# Patient Record
Sex: MALE | Race: WHITE
[De-identification: names, ages, dates, MRNs, and addresses within clinical notes are randomized per-mention and may not be internally consistent; named-entity substitution may affect disease eponyms.]

---

## 2018-03-22 ENCOUNTER — HOSPITAL ENCOUNTER (OUTPATIENT)
Dept: HOSPITAL 47 - RADECHMAIN | Age: 58
Discharge: HOME | End: 2018-03-22
Payer: COMMERCIAL

## 2018-03-22 DIAGNOSIS — I34.0: Primary | ICD-10-CM

## 2018-03-22 DIAGNOSIS — E66.01: ICD-10-CM

## 2018-03-22 PROCEDURE — 93306 TTE W/DOPPLER COMPLETE: CPT

## 2018-03-23 NOTE — ECHOF
Referral Reason:R01.1 Cardiac murmur



MEASUREMENTS

--------

HEIGHT: 175.3 cm

WEIGHT: 164.7 kg

BP: 

RVIDd:   3.9 cm     (< 3.3)

IVSd:   1.5 cm     (0.6 - 1.1)

LVIDd:   5.6 cm     (3.9 - 5.3)

LVPWd:   1.1 cm     (0.6 - 1.1)

IVSs:   1.6 cm

LVIDs:   4.2 cm

LVPWs:   1.0 cm

LA Diam:   4.6 cm     (2.7 - 3.8)

LAESV Index (A-L):   36.10 ml/m

EPSS:   0.4 cm

Ao Diam:   3.4 cm     (2.0 - 3.7)

LA Diam:   2.2 cm     (2.7 - 3.8)

RVIDd:   3.8 cm     (< 3.3)

MV E Dutch:   0.73 m/s

MV DecT:   331 ms

MV A Dutch:   0.83 m/s

MV E/A Ratio:   0.89 

RAP:   5.00 mmHg

RVSP:   19.64 mmHg

MV EF SLOPE:   91.32 mm/s     (70 - 150)

MV EXCURSION:   1.94 cm     (> 18.000)







FINDINGS

--------

Sinus rhythm.

Morbid Obesity

The left ventricular size is normal.   There is mild concentric left ventricular hypertrophy.   Overa
ll left ventricular systolic function is low-normal with, an EF between 50 - 55 %.

The right ventricle is moderate to  severely enlarged.

The left atrium is markedly dilated.   LA is moderately dilated 34-39 ml/m2

The right atrial size is normal.

The aortic valve is trileaflet, and appears structurally normal. No aortic stenosis or regurgitation.


Mild mitral regurgitation is present.

No regurgitation noted   There is no evidence of pulmonary hypertension.

There is no pulmonic regurgitation present.

The aortic root size is normal.

There is no pericardial effusion.



CONCLUSIONS

--------

1. Morbid Obesity

2. The left ventricular size is normal.

3. There is mild concentric left ventricular hypertrophy.

4. Overall left ventricular systolic function is low-normal with, an EF between 50 - 55 %.

5. The right ventricle is moderate to  severely enlarged.

6. The left atrium is markedly dilated.

7. LA is moderately dilated 34-39 ml/m2

8. The aortic valve is trileaflet, and appears structurally normal. No aortic stenosis or regurgitati
on.

9. Mild mitral regurgitation is present.

10. No regurgitation noted

11. There is no evidence of pulmonary hypertension.

12. There is no pulmonic regurgitation present.

13. The aortic root size is normal.

14. There is no pericardial effusion.





SONOGRAPHER: SUKHDEEP Andino

## 2023-01-17 ENCOUNTER — HOSPITAL ENCOUNTER (OUTPATIENT)
Dept: HOSPITAL 47 - RADUSWWP | Age: 63
Discharge: HOME | End: 2023-01-17
Attending: FAMILY MEDICINE
Payer: COMMERCIAL

## 2023-01-17 DIAGNOSIS — I10: Primary | ICD-10-CM

## 2023-01-17 PROCEDURE — 93975 VASCULAR STUDY: CPT

## 2023-01-17 NOTE — US
EXAMINATION TYPE: US renal artery duplex complet

 

DATE OF EXAM: 1/17/2023

 

COMPARISON: NONE

 

CLINICAL HISTORY: 62-year-old male I10 ESSENTIAL HYPERTENSION. Patient is morbidly obese, over 400lbs
, new onset of HTN, patient was given order for renal artery doppler prior to starting BP meds

 

FINDINGS:

 

Sonographer notes:  **Unable to do full study due to morbid obesity and overlying bowel gas** 

 

The provided Doppler images are essentially nondiagnostic due to patient's size. Only small segments 
of either the mid or distal renal arteries could barely be visualized and measured velocities here ar
e likely inaccurate.

 

MEASUREMENTS:

 

RENAL SIZE:

Rt Kidney: 11.8 x 6.3 x 5.9

Lt Kidney: 11.2 x 5.9 x 8.3

 

There is no obvious hydronephrosis. Suspect a 3.1 cm upper to mid pole left renal cortical cyst.

 

RESISTANCE INDEX

Right:  Unable to reliably assess

Left: Unable to reliably assess

 

RA/AO RATIO (< 3.5 )

Right: 1.6 *estimate based off of very limited imaging

Left: 0.8 *estimate based off of very limited imaging

 

RA VELOCITY ( < 180 cm/s)

Right: 107

Left: 53.4

 

Note that these measurements may be inaccurate and are based on very limited sampling of portions of 
the mid or distal renal arteries.

 

 

 

IMPRESSION:

Very large patient size makes the exam essentially nondiagnostic. Unable to satisfactorily exclude th
e possibility of underlying renal artery stenosis. If persistent concern, consider renal artery CTA (
but note that large patient size may limit CT as well).

## 2023-04-04 ENCOUNTER — HOSPITAL ENCOUNTER (OUTPATIENT)
Dept: HOSPITAL 47 - RADXRMAIN | Age: 63
Discharge: HOME | End: 2023-04-04
Attending: INTERNAL MEDICINE
Payer: COMMERCIAL

## 2023-04-04 DIAGNOSIS — J45.998: Primary | ICD-10-CM

## 2023-04-04 DIAGNOSIS — E78.5: ICD-10-CM

## 2023-04-04 DIAGNOSIS — I10: ICD-10-CM

## 2023-04-04 PROCEDURE — 71046 X-RAY EXAM CHEST 2 VIEWS: CPT

## 2023-04-04 NOTE — XR
EXAMINATION TYPE: XR chest 2V

 

DATE OF EXAM: 4/4/2023 1:42 PM

 

COMPARISON: None

 

TECHNIQUE: XR chest 2V Frontal and lateral views of the chest.

 

CLINICAL INDICATION:Male, 62 years old with history of I10 HTN; 

 

FINDINGS: 

Lungs/Pleura: There is no evidence of pleural effusion, focal consolidation, or pneumothorax.  

Pulmonary vascularity: Unremarkable.

Heart/mediastinum: Cardiomediastinal silhouette is unremarkable.

Musculoskeletal: Multiple level degenerative disc disease changes seen throughout the spine. No acute
 osseous abnormality.

 

IMPRESSION: 

No acute cardiopulmonary disease/process.

## 2023-04-19 ENCOUNTER — HOSPITAL ENCOUNTER (OUTPATIENT)
Dept: HOSPITAL 47 - RADUSWWP | Age: 63
Discharge: HOME | End: 2023-04-19
Attending: FAMILY MEDICINE
Payer: COMMERCIAL

## 2023-04-19 DIAGNOSIS — Z53.9: Primary | ICD-10-CM

## 2023-04-26 ENCOUNTER — HOSPITAL ENCOUNTER (OUTPATIENT)
Dept: HOSPITAL 47 - RADUSWWP | Age: 63
Discharge: HOME | End: 2023-04-26
Attending: FAMILY MEDICINE
Payer: COMMERCIAL

## 2023-04-26 DIAGNOSIS — M79.89: ICD-10-CM

## 2023-04-26 DIAGNOSIS — E78.2: Primary | ICD-10-CM

## 2023-04-26 DIAGNOSIS — R25.2: ICD-10-CM

## 2023-04-26 PROCEDURE — 93970 EXTREMITY STUDY: CPT

## 2023-04-26 PROCEDURE — 93922 UPR/L XTREMITY ART 2 LEVELS: CPT

## 2023-04-26 NOTE — US
EXAMINATION TYPE: US venous doppler duplex LE 

 

DATE OF EXAM: 4/26/2023 12:53 PM

 

COMPARISON: NONE

 

CLINICAL INDICATION: Male, 62 years old with history of E78.2 HYPERLIPIDEMIA,M79.89 SOFT TISSUE DISOR
DERS;  left leg edema

 

SIDE PERFORMED: bilateral   

 

TECHNIQUE:  The lower extremity deep venous system is examined utilizing real time linear array sonog
alvarez with graded compression, doppler sonography and color-flow sonography.

 

VESSELS IMAGED:

Common Femoral Vein

Deep Femoral Vein

Greater Saphenous Vein *

Femoral Vein

Popliteal Vein

Small Saphenous Vein *

Proximal Calf Veins

(* superficial vessels)

 

*Limitations due to patient's body habitus, limited evaluation of groin vessels

 

Right Leg:  no evidence of DVT as visualized

 

Left Leg:  no evidence of DVT as visualized 

 

 

 

IMPRESSION:  Grayscale, color doppler, spectral doppler imaging performed of the deep veins of the lo
wer extremities.  There is normal flow, compressibility, vascular waveforms.

## 2025-03-02 ENCOUNTER — HOSPITAL ENCOUNTER (INPATIENT)
Dept: HOSPITAL 47 - EC | Age: 65
LOS: 8 days | Discharge: HOME | DRG: 280 | End: 2025-03-10
Attending: INTERNAL MEDICINE | Admitting: INTERNAL MEDICINE
Payer: COMMERCIAL

## 2025-03-02 VITALS — BODY MASS INDEX: 55.6 KG/M2

## 2025-03-02 DIAGNOSIS — Z79.899: ICD-10-CM

## 2025-03-02 DIAGNOSIS — N39.0: ICD-10-CM

## 2025-03-02 DIAGNOSIS — J96.22: ICD-10-CM

## 2025-03-02 DIAGNOSIS — E78.5: ICD-10-CM

## 2025-03-02 DIAGNOSIS — Z87.891: ICD-10-CM

## 2025-03-02 DIAGNOSIS — Z91.199: ICD-10-CM

## 2025-03-02 DIAGNOSIS — I21.A1: ICD-10-CM

## 2025-03-02 DIAGNOSIS — J96.21: ICD-10-CM

## 2025-03-02 DIAGNOSIS — E87.20: ICD-10-CM

## 2025-03-02 DIAGNOSIS — D72.829: ICD-10-CM

## 2025-03-02 DIAGNOSIS — Z71.3: ICD-10-CM

## 2025-03-02 DIAGNOSIS — J45.909: ICD-10-CM

## 2025-03-02 DIAGNOSIS — Z91.148: ICD-10-CM

## 2025-03-02 DIAGNOSIS — I27.20: ICD-10-CM

## 2025-03-02 DIAGNOSIS — I11.0: Primary | ICD-10-CM

## 2025-03-02 DIAGNOSIS — I50.33: ICD-10-CM

## 2025-03-02 DIAGNOSIS — E66.2: ICD-10-CM

## 2025-03-02 DIAGNOSIS — E11.65: ICD-10-CM

## 2025-03-02 LAB
ALBUMIN SERPL-MCNC: 3.9 G/DL (ref 3.5–5)
ALP SERPL-CCNC: 83 U/L (ref 38–126)
ALT SERPL-CCNC: 23 U/L (ref 4–49)
AMYLASE SERPL-CCNC: 47 U/L (ref 30–110)
ANION GAP SERPL CALC-SCNC: 9 MMOL/L
APTT BLD: 28.7 SEC (ref 22–30)
AST SERPL-CCNC: 27 U/L (ref 17–59)
BASOPHILS # BLD AUTO: 0.1 K/UL (ref 0–0.2)
BASOPHILS NFR BLD AUTO: 0 %
BUN SERPL-SCNC: 14 MG/DL (ref 9–20)
CALCIUM SPEC-MCNC: 9.5 MG/DL (ref 8.4–10.2)
CHLORIDE SERPL-SCNC: 95 MMOL/L (ref 98–107)
CO2 SERPL-SCNC: 36 MMOL/L (ref 22–30)
EOSINOPHIL # BLD AUTO: 0.3 K/UL (ref 0–0.7)
EOSINOPHIL NFR BLD AUTO: 2 %
ERYTHROCYTE [DISTWIDTH] IN BLOOD BY AUTOMATED COUNT: 5.1 M/UL (ref 4.3–5.9)
ERYTHROCYTE [DISTWIDTH] IN BLOOD: 16.2 % (ref 11.5–15.5)
GLUCOSE SERPL-MCNC: 136 MG/DL (ref 74–99)
HCT VFR BLD AUTO: 50.3 % (ref 39–53)
HGB BLD-MCNC: 15.5 GM/DL (ref 13–17.5)
HYALINE CASTS UR QL AUTO: 1 /LPF (ref 0–2)
INR PPP: 1.3 (ref ?–1.2)
LIPASE SERPL-CCNC: 116 U/L (ref 23–300)
LYMPHOCYTES # SPEC AUTO: 1.5 K/UL (ref 1–4.8)
LYMPHOCYTES NFR SPEC AUTO: 9 %
MAGNESIUM SPEC-SCNC: 1.7 MG/DL (ref 1.6–2.3)
MCH RBC QN AUTO: 30.3 PG (ref 25–35)
MCHC RBC AUTO-ENTMCNC: 30.7 G/DL (ref 31–37)
MCV RBC AUTO: 98.7 FL (ref 80–100)
MONOCYTES # BLD AUTO: 0.7 K/UL (ref 0–1)
MONOCYTES NFR BLD AUTO: 4 %
NEUTROPHILS # BLD AUTO: 14.8 K/UL (ref 1.3–7.7)
NEUTROPHILS NFR BLD AUTO: 85 %
NT-PROBNP SERPL-MCNC: 2780 PG/ML
PH UR: 6 [PH] (ref 5–8)
PLATELET # BLD AUTO: 220 K/UL (ref 150–450)
POTASSIUM SERPL-SCNC: 4.6 MMOL/L (ref 3.5–5.1)
PROT SERPL-MCNC: 6.8 G/DL (ref 6.3–8.2)
PROT UR QL: (no result)
PT BLD: 13.6 SEC (ref 10–12.5)
RBC UR QL: 3 /HPF (ref 0–5)
SODIUM SERPL-SCNC: 140 MMOL/L (ref 137–145)
SP GR UR: 1.02 (ref 1–1.03)
SQUAMOUS UR QL AUTO: 1 /HPF (ref 0–4)
UROBILINOGEN UR QL STRIP: 3 MG/DL (ref ?–2)
WBC # BLD AUTO: 17.5 K/UL (ref 3.8–10.6)
WBC #/AREA URNS HPF: 11 /HPF (ref 0–5)

## 2025-03-02 PROCEDURE — 83690 ASSAY OF LIPASE: CPT

## 2025-03-02 PROCEDURE — 82803 BLOOD GASES ANY COMBINATION: CPT

## 2025-03-02 PROCEDURE — 71046 X-RAY EXAM CHEST 2 VIEWS: CPT

## 2025-03-02 PROCEDURE — 85379 FIBRIN DEGRADATION QUANT: CPT

## 2025-03-02 PROCEDURE — 83880 ASSAY OF NATRIURETIC PEPTIDE: CPT

## 2025-03-02 PROCEDURE — 94760 N-INVAS EAR/PLS OXIMETRY 1: CPT

## 2025-03-02 PROCEDURE — 83605 ASSAY OF LACTIC ACID: CPT

## 2025-03-02 PROCEDURE — 82150 ASSAY OF AMYLASE: CPT

## 2025-03-02 PROCEDURE — 96375 TX/PRO/DX INJ NEW DRUG ADDON: CPT

## 2025-03-02 PROCEDURE — 36600 WITHDRAWAL OF ARTERIAL BLOOD: CPT

## 2025-03-02 PROCEDURE — 82805 BLOOD GASES W/O2 SATURATION: CPT

## 2025-03-02 PROCEDURE — 94660 CPAP INITIATION&MGMT: CPT

## 2025-03-02 PROCEDURE — 83036 HEMOGLOBIN GLYCOSYLATED A1C: CPT

## 2025-03-02 PROCEDURE — 87636 SARSCOV2 & INF A&B AMP PRB: CPT

## 2025-03-02 PROCEDURE — 85025 COMPLETE CBC W/AUTO DIFF WBC: CPT

## 2025-03-02 PROCEDURE — 85027 COMPLETE CBC AUTOMATED: CPT

## 2025-03-02 PROCEDURE — 80053 COMPREHEN METABOLIC PANEL: CPT

## 2025-03-02 PROCEDURE — 83735 ASSAY OF MAGNESIUM: CPT

## 2025-03-02 PROCEDURE — 36415 COLL VENOUS BLD VENIPUNCTURE: CPT

## 2025-03-02 PROCEDURE — 84145 PROCALCITONIN (PCT): CPT

## 2025-03-02 PROCEDURE — 84443 ASSAY THYROID STIM HORMONE: CPT

## 2025-03-02 PROCEDURE — 99285 EMERGENCY DEPT VISIT HI MDM: CPT

## 2025-03-02 PROCEDURE — 94640 AIRWAY INHALATION TREATMENT: CPT

## 2025-03-02 PROCEDURE — 84484 ASSAY OF TROPONIN QUANT: CPT

## 2025-03-02 PROCEDURE — 80048 BASIC METABOLIC PNL TOTAL CA: CPT

## 2025-03-02 PROCEDURE — 82607 VITAMIN B-12: CPT

## 2025-03-02 PROCEDURE — 96374 THER/PROPH/DIAG INJ IV PUSH: CPT

## 2025-03-02 PROCEDURE — 86140 C-REACTIVE PROTEIN: CPT

## 2025-03-02 PROCEDURE — 81001 URINALYSIS AUTO W/SCOPE: CPT

## 2025-03-02 PROCEDURE — 85610 PROTHROMBIN TIME: CPT

## 2025-03-02 PROCEDURE — 85730 THROMBOPLASTIN TIME PARTIAL: CPT

## 2025-03-02 PROCEDURE — 80061 LIPID PANEL: CPT

## 2025-03-02 PROCEDURE — 93306 TTE W/DOPPLER COMPLETE: CPT

## 2025-03-02 RX ADMIN — FUROSEMIDE SCH MG: 10 INJECTION, SOLUTION INTRAMUSCULAR; INTRAVENOUS at 23:11

## 2025-03-02 RX ADMIN — NITROGLYCERIN STA MG: 0.4 TABLET SUBLINGUAL at 17:01

## 2025-03-02 RX ADMIN — FUROSEMIDE STA MG: 10 INJECTION, SOLUTION INTRAMUSCULAR; INTRAVENOUS at 15:42

## 2025-03-02 RX ADMIN — ASPIRIN 81 MG CHEWABLE TABLET STA MG: 81 TABLET CHEWABLE at 15:44

## 2025-03-02 RX ADMIN — NITROGLYCERIN STA INCH: 20 OINTMENT TOPICAL at 15:45

## 2025-03-02 RX ADMIN — HEPARIN SODIUM SCH UNIT: 5000 INJECTION, SOLUTION INTRAVENOUS; SUBCUTANEOUS at 21:02

## 2025-03-02 RX ADMIN — ENALAPRILAT SCH MG: 1.25 INJECTION INTRAVENOUS at 16:39

## 2025-03-02 NOTE — XR
EXAMINATION TYPE: XR chest 2V

 

DATE OF EXAM: 3/2/2025 3:13 PM

 

COMPARISON: Previous chest radiograph 4/4/2023.

 

CLINICAL INDICATION: Male, 64 years old with history of TAMERA; PHH

 

TECHNIQUE: XR chest 2V Frontal and lateral views of the chest.

 

FINDINGS: 

Lungs/Pleura: No acute focal consolidation or evidence of pneumothorax. Mild pulmonary vascular conge
stive changes and possible trace effusions.

Pulmonary vascularity: Unremarkable.

Heart/mediastinum: Cardiomegaly.

Musculoskeletal: No acute osseous pathology.

 

Other findings: None

 

IMPRESSION: 

Cardiomegaly and mild pulmonary vascular congestive changes suggestive of CHF.

 

 

 

[facility], Workstation: [id number], 3/2/2025 3:21 PM

## 2025-03-02 NOTE — P.HPIM
History of Present Illness


H&P Date: 03/02/25


Chief Complaint: Abnormal pain/shortness of breath





64-year-old male who presents to the emergency department for abdominal pain and

shortness of breath.  States that he has been dealing with shortness of breath 

for the last 2 weeks.  He has also started to notice swelling in his lower 

extremities and abdomen.  Today he felt a pain in his left upper quadrant that 

occurred about 2 hours prior to arrival.  This has since persisted, prompting 

him to come here for evaluation.  Denies any nausea or vomiting.  Denies any 

changes in bowel or bladder habits.  Denies any history of respiratory or 

cardiac problems.


Patient hypoxic on arrival with an oxygen of 87%.  This improved with 2 L via 

nasal cannula.  


Lab work demonstrates leukocytosis with a white blood cell count of 17.5.  D-

dimer negative.  Lactic acid elevated at 2.3.  BNP elevated at 2780 and troponin

elevated at 0.057.  Patient denies a history of CHF.  


Chest x-ray demonstrates cardiomegaly with pulmonary vascular congestion. 


COVID, influenza, and RSV testing negative.  


Urinalysis negative for signs of infection.  


Given his abdominal pain ER attempted to get a CT scan on the patient.  However,

he could not comfortably lay flat for the testing. 





Review of Systems











REVIEW OF SYSTEMS: 


CONSTITUTIONAL: No fever, no malaise, no fatigue. 


HEENT: No recent visual problems or hearing problems. Denied any sore throat. 


CARDIOVASCULAR: No chest pain, orthopnea, PND, no palpitations, no syncope. 


PULMONARY: No shortness of breath, no cough, no hemoptysis. 


GASTROINTESTINAL: No diarrhea, no nausea, no vomiting, no abdominal pain. 


NEUROLOGICAL: No headaches, no weakness, no numbness. 


HEMATOLOGICAL: Denies any bleeding or petechiae. 


GENITOURINARY: Denies any burning micturition, frequency, or urgency. 


MUSCULOSKELETAL/RHEUMATOLOGICAL: Denies any joint pain, swelling, or any muscle 

pain. 


ENDOCRINE: Denies any polyuria or polydipsia. 





The rest of the 14-point review of systems is negative.








Past Medical History


Past Medical History: Hypertension


History of Any Multi-Drug Resistant Organisms: None Reported


Past Surgical History: No Surgical Hx Reported


Past Psychological History: No Psychological Hx Reported


Smoking Status: Former smoker


Past Alcohol Use History: Rare


Past Drug Use History: None Reported





Medications and Allergies


                                Home Medications











 Medication  Instructions  Recorded  Confirmed  Type


 


No Known Home Medications  03/02/25 03/02/25 History








                                    Allergies











Allergy/AdvReac Type Severity Reaction Status Date / Time


 


tree nut Allergy  Anaphylaxis Verified 03/02/25 16:21














Physical Exam


Vitals: 


                                   Vital Signs











  Temp Pulse Resp BP Pulse Ox


 


 03/02/25 15:49   83  18  212/122  98


 


 03/02/25 12:53  98.3 F  102 H  28 H  194/114  87 L








                                Intake and Output











 03/02/25 03/02/25 03/02/25





 06:59 14:59 22:59


 


Other:   


 


  Weight  158.757 kg 














General appearance: alert, in no apparent distress


Head exam: Present: atraumatic, normocephalic, normal inspection


Respiratory exam: Present: decreased breath sounds, prolonged expiratory


Cardiovascular Exam: Present: normal rhythm, tachycardia


GI/Abdominal exam: Present: soft, distended, tenderness (diffuse), normal bowel 

sounds


Extremities exam: Present: other (Bilateral lower extremity swelling)


Neurological exam: Present: alert, oriented X3, CN II-XII intact


Psychiatric exam: Present: normal affect, normal mood


Skin exam: Present: warm, dry, intact, normal color.  Absent: rash





Results


CBC & Chem 7: 


                                 03/02/25 14:18





                                 03/02/25 14:18


Labs: 


                  Abnormal Lab Results - Last 24 Hours (Table)











  03/02/25 03/02/25 03/02/25 Range/Units





  14:18 14:18 14:18 


 


WBC  17.5 H    (3.8-10.6)  k/uL


 


MCHC  30.7 L    (31.0-37.0)  g/dL


 


RDW  16.2 H    (11.5-15.5)  %


 


Neutrophils #  14.8 H    (1.3-7.7)  k/uL


 


PT   13.6 H   (10.0-12.5)  sec


 


INR   1.3 H   (<1.2)  


 


Chloride     ()  mmol/L


 


Carbon Dioxide     (22-30)  mmol/L


 


Glucose     (74-99)  mg/dL


 


Plasma Lactic Acid Otto     (0.7-2.0)  mmol/L


 


Troponin I     (0.000-0.034)  ng/mL


 


Urine Protein    2+ H  (Negative)  


 


Urine Ketones    Trace H  (Negative)  


 


Ur Leukocyte Esterase    Small H  (Negative)  


 


Urine WBC    11 H  (0-5)  /hpf


 


Urine Mucus    Few H  (None)  /hpf














  03/02/25 03/02/25 03/02/25 Range/Units





  14:18 14:18 14:18 


 


WBC     (3.8-10.6)  k/uL


 


MCHC     (31.0-37.0)  g/dL


 


RDW     (11.5-15.5)  %


 


Neutrophils #     (1.3-7.7)  k/uL


 


PT     (10.0-12.5)  sec


 


INR     (<1.2)  


 


Chloride  95 L    ()  mmol/L


 


Carbon Dioxide  36 H    (22-30)  mmol/L


 


Glucose  136 H    (74-99)  mg/dL


 


Plasma Lactic Acid Otto   2.3 H*   (0.7-2.0)  mmol/L


 


Troponin I    0.057 H*  (0.000-0.034)  ng/mL


 


Urine Protein     (Negative)  


 


Urine Ketones     (Negative)  


 


Ur Leukocyte Esterase     (Negative)  


 


Urine WBC     (0-5)  /hpf


 


Urine Mucus     (None)  /hpf














Assessment and Plan


Assessment: 





1.  New onset CHF; acute exacerbation


-Chest x-ray reveals cardiomegaly with pulmonary vascular congestion; BNP 

elevated at 2780


-Patient is clinically fluid overloaded


-Will admit to telemetry; monitor EKG and trend troponin


-Patient will be placed on IV Lasix; monitor strict JANET's, daily weights, renal 

function electrolytes


-2D echo; consult cardiology





2.  Leukocytosis/possible UTI; lactic acid is elevated at 2.3; no signs of 

infection; chest x-ray is negative for any acute process


-UA reveals small amount of leukocyte esterase


-Will start patient empirically on IV Rocephin and repeat CBC; procalcitonin and

CRP





3.  Elevated troponin; likely related to hypoxia 


- initial troponin is at 0.057; we will trend with plans to initiate IV heparin 

infusion if troponin continues to trend up





4.  Hypertension; lisinopril 20 mg daily





5.  Morbid obesity; counseling done on need for weight reduction





DVT prophylaxis; SCDs/subcu heparin


CODE STATUS; full code

## 2025-03-02 NOTE — ED
Abdominal Pain HPI





- General


Chief Complaint: Abdominal Pain


Stated Complaint: abd pain


Time Seen by Provider: 03/02/25 13:41


Source: patient, RN notes reviewed


Mode of arrival: wheelchair


Limitations: no limitations





- History of Present Illness


Initial Comments: 


This is a 64-year-old male who presents to the emergency department for 

abdominal pain and shortness of breath.  States that he has been dealing with 

shortness of breath for the last 2 weeks.  He has also started to notice 

swelling in his lower extremities and abdomen.  Today he felt a pain in his left

upper quadrant that occurred about 2 hours prior to arrival.  This has since 

persisted, prompting him to come here for evaluation.  Denies any nausea or 

vomiting.  Denies any changes in bowel or bladder habits.  Denies any history of

respiratory or cardiac problems.





MD Complaint: abdominal pain





- Related Data


                                Home Medications











 Medication  Instructions  Recorded  Confirmed


 


No Known Home Medications  03/02/25 03/02/25











                                    Allergies











Allergy/AdvReac Type Severity Reaction Status Date / Time


 


tree nut Allergy  Anaphylaxis Verified 03/02/25 16:21














Review of Systems


ROS Statement: 


Those systems with pertinent positive or pertinent negative responses have been 

documented in the HPI.





ROS Other: All systems not noted in ROS Statement are negative.





Past Medical History


Past Medical History: Hypertension


History of Any Multi-Drug Resistant Organisms: None Reported


Past Surgical History: No Surgical Hx Reported


Past Psychological History: No Psychological Hx Reported


Smoking Status: Former smoker


Past Alcohol Use History: Rare


Past Drug Use History: None Reported





General Exam


Limitations: no limitations


General appearance: alert, in no apparent distress


Head exam: Present: atraumatic, normocephalic, normal inspection


Respiratory exam: Present: decreased breath sounds, prolonged expiratory


Cardiovascular Exam: Present: normal rhythm, tachycardia


GI/Abdominal exam: Present: soft, distended, tenderness (diffuse), normal bowel 

sounds


Extremities exam: Present: other (Bilateral lower extremity swelling)


Neurological exam: Present: alert, oriented X3, CN II-XII intact


Psychiatric exam: Present: normal affect, normal mood


Skin exam: Present: warm, dry, intact, normal color.  Absent: rash





Course


                                   Vital Signs











  03/02/25 03/02/25 03/02/25





  12:53 15:49 17:02


 


Temperature 98.3 F  


 


Pulse Rate 102 H 83 


 


Respiratory 28 H 18 





Rate   


 


Blood Pressure 194/114 212/122 169/93


 


O2 Sat by Pulse 87 L 98 





Oximetry   














Medical Decision Making





- Medical Decision Making


This is a 64-year-old male who presents to the emergency department for 

abdominal pain and shortness of breath.





Was pt. sent in by a medical professional or institution?


@  -No   


Did you speak to anyone other than the patient for history?  


@  -No


Did you review nursing and triage notes? 


@  -Yes, and I agree, it is accurate with regards to the patient's symptoms.


Were old charts reviewed? 


@  -No


Differential Diagnosis? 


@  -Differential Abdominal Pain Men:


Appendicitis, cholecystitis, diverticulosis, ischemic bowel, pancreatitis, 

hepatitis, UTI, gastroenteritis, AAA, incarcerated hernia, bowel obstruction, 

constipation, inflammatory bowel, hepatitis, peptic ulcer disease, splenic 

infarction, perforated viscus, testicular torsion, this is not meant to be an 

all-inclusive list


EKG interpreted by me (3pts min.)?


@  -EKG interpreted by me demonstrating the following: Sinus tachycardia.  

Ventricular rate 100 bpm, AR interval 155 ms, QRS duration 116 ms, QTc 419 ms.


X-rays interpreted by me (1pt min.)?


@  -Chest x-ray obtained.  My interpretation identifies pulmonary vascular 

congestion.


CT interpreted by me (1pt min.)?


@  -Ordered, not obtained at the point of admission.


U/S interpreted by me (1pt. min.)?


@  -Not obtained


What testing was considered but not performed? (CT, X-rays, U/S, labs)? Why?


@  -None


What meds were considered but not given? Why?


@  -None


Did you discuss the management of the patient with other professionals?


@  -Yes, Dr. Cortez, who accepts the patient for admission.


Did you reconcile home meds?


@  -Yes


Was smoking cessation discussed for >3mins.?


@  -No


Was critical care preformed (if so, how long)?


@  -No


Were there social determinants of health that impacted care today? How? 

(Homelessness, low income, unemployed, alcoholism, drug addiction, 

transportation, low edu. Level, literacy, decrease access to med. care, [facility], 

rehab)?


@  -No


Was there de-escalation of care discussed even if they declined? (Discuss DNR or

withdrawal of care, Hospice)?


@  -No


What co-morbidities impacted this encounter? (DM, HTN, Smoking, COPD, CAD, 

Cancer, CVA, Hep., AIDS, mental health diagnosis, sleep apnea, morbid obesity)?


@  -Morbid obesity


Was patient admitted / discharged?


@  -Admitted.  Patient hypoxic on arrival with an oxygen of 87%.  This improved 

with 2 L via nasal cannula.  Lab work demonstrates leukocytosis with a white 

blood cell count of 17.5.  D-dimer negative.  Lactic acid elevated at 2.3.  BNP 

elevated at 2780 and troponin elevated at 0.057.  Patient denies a history of C

HF.  Chest x-ray demonstrates cardiomegaly with pulmonary vascular congestion.  

On exam patient also appears to be retaining fluid in his lower extremities and 

abdomen.  COVID, influenza, and RSV testing negative.  Urinalysis negative for 

signs of infection.  Given his abdominal pain we attempted to get a CT scan on 

the patient.  However, he could not comfortably lay flat for the testing.  He 

was not in any significant distress and will plan on obtaining the CT scan after

admission when he is able to lay flat more comfortably.  40 mg of IV Lasix 

administered.  Nitropaste applied as well to help with both his blood pressure 

and respirations.  Vasotec also ordered due to his blood pressure being 

persistently elevated in the 200s systolically.  Blood pressure did start to 

improve with both of these interventions.  Patient admitted to medicine for new 

onset CHF with elevated troponin and hypoxia.  Serial troponins ordered and 

consult was placed for cardiology.  Echocardiogram ordered as well.  Case 

discussed with ED attending Dr. Reyna.


Undiagnosed new problem with uncertain prognosis?


@  -None


Drug Therapy requiring intensive monitoring for toxicity (Heparin, Nitro, 

Insulin, Cardizem)?


@  -None


Were any procedures done?


@  -None


Diagnosis/symptom?


@  -New onset CHF, elevated troponin, hypoxia


Acute, or Chronic, or Acute on Chronic?


@  -Acute


Uncomplicated (without systemic symptoms) or Complicated (systemic symptoms)?


@  -Complicated


Side effects of treatment?


@  -None


Exacerbation, Progression, or Severe Exacerbation]


@  -Not applicable


Poses a threat to life or bodily function?


@  -Yes, can lead to further respiratory compromise and death








- Lab Data


Result diagrams: 


                                 03/02/25 14:18





                                 03/02/25 14:18


                                   Lab Results











  03/02/25 03/02/25 03/02/25 Range/Units





  14:10 14:18 14:18 


 


WBC   17.5 H   (3.8-10.6)  k/uL


 


RBC   5.10   (4.30-5.90)  m/uL


 


Hgb   15.5   (13.0-17.5)  gm/dL


 


Hct   50.3   (39.0-53.0)  %


 


MCV   98.7   (80.0-100.0)  fL


 


MCH   30.3   (25.0-35.0)  pg


 


MCHC   30.7 L   (31.0-37.0)  g/dL


 


RDW   16.2 H   (11.5-15.5)  %


 


Plt Count   220   (150-450)  k/uL


 


MPV   8.0   


 


Neutrophils %   85   %


 


Lymphocytes %   9   %


 


Monocytes %   4   %


 


Eosinophils %   2   %


 


Basophils %   0   %


 


Neutrophils #   14.8 H   (1.3-7.7)  k/uL


 


Lymphocytes #   1.5   (1.0-4.8)  k/uL


 


Monocytes #   0.7   (0-1.0)  k/uL


 


Eosinophils #   0.3   (0-0.7)  k/uL


 


Basophils #   0.1   (0-0.2)  k/uL


 


Hypochromasia   Marked   


 


Anisocytosis   Slight   


 


Macrocytosis   Slight   


 


PT    13.6 H  (10.0-12.5)  sec


 


INR    1.3 H  (<1.2)  


 


APTT    28.7  (22.0-30.0)  sec


 


D-Dimer    0.59  (<0.60)  mg/L FEU


 


Sodium     (137-145)  mmol/L


 


Potassium     (3.5-5.1)  mmol/L


 


Chloride     ()  mmol/L


 


Carbon Dioxide     (22-30)  mmol/L


 


Anion Gap     mmol/L


 


BUN     (9-20)  mg/dL


 


Creatinine     (0.66-1.25)  mg/dL


 


Est GFR (CKD-EPI)AfAm     (>60 ml/min/1.73 sqM)  


 


Est GFR (CKD-EPI)NonAf     (>60 ml/min/1.73 sqM)  


 


Glucose     (74-99)  mg/dL


 


Lactic Ac Sepsis Rflx     


 


Plasma Lactic Acid Otto     (0.7-2.0)  mmol/L


 


Calcium     (8.4-10.2)  mg/dL


 


Magnesium     (1.6-2.3)  mg/dL


 


Total Bilirubin     (0.2-1.3)  mg/dL


 


AST     (17-59)  U/L


 


ALT     (4-49)  U/L


 


Alkaline Phosphatase     ()  U/L


 


Troponin I     (0.000-0.034)  ng/mL


 


NT-Pro-B Natriuret Pep     pg/mL


 


Total Protein     (6.3-8.2)  g/dL


 


Albumin     (3.5-5.0)  g/dL


 


Amylase     ()  U/L


 


Lipase     ()  U/L


 


Urine Color     


 


Urine Appearance     (Clear)  


 


Urine pH     (5.0-8.0)  


 


Ur Specific Gravity     (1.001-1.035)  


 


Urine Protein     (Negative)  


 


Urine Glucose (UA)     (Negative)  


 


Urine Ketones     (Negative)  


 


Urine Blood     (Negative)  


 


Urine Nitrite     (Negative)  


 


Urine Bilirubin     (Negative)  


 


Urine Urobilinogen     (<2.0)  mg/dL


 


Ur Leukocyte Esterase     (Negative)  


 


Urine RBC     (0-5)  /hpf


 


Urine WBC     (0-5)  /hpf


 


Ur Squamous Epith Cells     (0-4)  /hpf


 


Hyaline Casts     (0-2)  /lpf


 


Urine Mucus     (None)  /hpf


 


Influenza Type A (PCR)  Not Detected    (Not Detectd)  


 


Influenza Type B (PCR)  Not Detected    (Not Detectd)  


 


RSV (PCR)  Not Detected    (Not Detectd)  


 


SARS-CoV-2 (PCR)  Not Detected    (Not Detectd)  














  03/02/25 03/02/25 03/02/25 Range/Units





  14:18 14:18 14:18 


 


WBC     (3.8-10.6)  k/uL


 


RBC     (4.30-5.90)  m/uL


 


Hgb     (13.0-17.5)  gm/dL


 


Hct     (39.0-53.0)  %


 


MCV     (80.0-100.0)  fL


 


MCH     (25.0-35.0)  pg


 


MCHC     (31.0-37.0)  g/dL


 


RDW     (11.5-15.5)  %


 


Plt Count     (150-450)  k/uL


 


MPV     


 


Neutrophils %     %


 


Lymphocytes %     %


 


Monocytes %     %


 


Eosinophils %     %


 


Basophils %     %


 


Neutrophils #     (1.3-7.7)  k/uL


 


Lymphocytes #     (1.0-4.8)  k/uL


 


Monocytes #     (0-1.0)  k/uL


 


Eosinophils #     (0-0.7)  k/uL


 


Basophils #     (0-0.2)  k/uL


 


Hypochromasia     


 


Anisocytosis     


 


Macrocytosis     


 


PT     (10.0-12.5)  sec


 


INR     (<1.2)  


 


APTT     (22.0-30.0)  sec


 


D-Dimer     (<0.60)  mg/L FEU


 


Sodium   140   (137-145)  mmol/L


 


Potassium   4.6   (3.5-5.1)  mmol/L


 


Chloride   95 L   ()  mmol/L


 


Carbon Dioxide   36 H   (22-30)  mmol/L


 


Anion Gap   9   mmol/L


 


BUN   14   (9-20)  mg/dL


 


Creatinine   0.85   (0.66-1.25)  mg/dL


 


Est GFR (CKD-EPI)AfAm   >90   (>60 ml/min/1.73 sqM)  


 


Est GFR (CKD-EPI)NonAf   >90   (>60 ml/min/1.73 sqM)  


 


Glucose   136 H   (74-99)  mg/dL


 


Lactic Ac Sepsis Rflx     


 


Plasma Lactic Acid Otto    2.3 H*  (0.7-2.0)  mmol/L


 


Calcium   9.5   (8.4-10.2)  mg/dL


 


Magnesium   1.7   (1.6-2.3)  mg/dL


 


Total Bilirubin   1.3   (0.2-1.3)  mg/dL


 


AST   27   (17-59)  U/L


 


ALT   23   (4-49)  U/L


 


Alkaline Phosphatase   83   ()  U/L


 


Troponin I     (0.000-0.034)  ng/mL


 


NT-Pro-B Natriuret Pep   2780   pg/mL


 


Total Protein   6.8   (6.3-8.2)  g/dL


 


Albumin   3.9   (3.5-5.0)  g/dL


 


Amylase   47   ()  U/L


 


Lipase   116   ()  U/L


 


Urine Color  Yellow    


 


Urine Appearance  Cloudy    (Clear)  


 


Urine pH  6.0    (5.0-8.0)  


 


Ur Specific Gravity  1.019    (1.001-1.035)  


 


Urine Protein  2+ H    (Negative)  


 


Urine Glucose (UA)  Negative    (Negative)  


 


Urine Ketones  Trace H    (Negative)  


 


Urine Blood  Negative    (Negative)  


 


Urine Nitrite  Negative    (Negative)  


 


Urine Bilirubin  Negative    (Negative)  


 


Urine Urobilinogen  3.0    (<2.0)  mg/dL


 


Ur Leukocyte Esterase  Small H    (Negative)  


 


Urine RBC  3    (0-5)  /hpf


 


Urine WBC  11 H    (0-5)  /hpf


 


Ur Squamous Epith Cells  1    (0-4)  /hpf


 


Hyaline Casts  1    (0-2)  /lpf


 


Urine Mucus  Few H    (None)  /hpf


 


Influenza Type A (PCR)     (Not Detectd)  


 


Influenza Type B (PCR)     (Not Detectd)  


 


RSV (PCR)     (Not Detectd)  


 


SARS-CoV-2 (PCR)     (Not Detectd)  














  03/02/25 03/02/25 Range/Units





  14:18 15:04 


 


WBC    (3.8-10.6)  k/uL


 


RBC    (4.30-5.90)  m/uL


 


Hgb    (13.0-17.5)  gm/dL


 


Hct    (39.0-53.0)  %


 


MCV    (80.0-100.0)  fL


 


MCH    (25.0-35.0)  pg


 


MCHC    (31.0-37.0)  g/dL


 


RDW    (11.5-15.5)  %


 


Plt Count    (150-450)  k/uL


 


MPV    


 


Neutrophils %    %


 


Lymphocytes %    %


 


Monocytes %    %


 


Eosinophils %    %


 


Basophils %    %


 


Neutrophils #    (1.3-7.7)  k/uL


 


Lymphocytes #    (1.0-4.8)  k/uL


 


Monocytes #    (0-1.0)  k/uL


 


Eosinophils #    (0-0.7)  k/uL


 


Basophils #    (0-0.2)  k/uL


 


Hypochromasia    


 


Anisocytosis    


 


Macrocytosis    


 


PT    (10.0-12.5)  sec


 


INR    (<1.2)  


 


APTT    (22.0-30.0)  sec


 


D-Dimer    (<0.60)  mg/L FEU


 


Sodium    (137-145)  mmol/L


 


Potassium    (3.5-5.1)  mmol/L


 


Chloride    ()  mmol/L


 


Carbon Dioxide    (22-30)  mmol/L


 


Anion Gap    mmol/L


 


BUN    (9-20)  mg/dL


 


Creatinine    (0.66-1.25)  mg/dL


 


Est GFR (CKD-EPI)AfAm    (>60 ml/min/1.73 sqM)  


 


Est GFR (CKD-EPI)NonAf    (>60 ml/min/1.73 sqM)  


 


Glucose    (74-99)  mg/dL


 


Lactic Ac Sepsis Rflx   Y  


 


Plasma Lactic Acid Otto    (0.7-2.0)  mmol/L


 


Calcium    (8.4-10.2)  mg/dL


 


Magnesium    (1.6-2.3)  mg/dL


 


Total Bilirubin    (0.2-1.3)  mg/dL


 


AST    (17-59)  U/L


 


ALT    (4-49)  U/L


 


Alkaline Phosphatase    ()  U/L


 


Troponin I  0.057 H*   (0.000-0.034)  ng/mL


 


NT-Pro-B Natriuret Pep    pg/mL


 


Total Protein    (6.3-8.2)  g/dL


 


Albumin    (3.5-5.0)  g/dL


 


Amylase    ()  U/L


 


Lipase    ()  U/L


 


Urine Color    


 


Urine Appearance    (Clear)  


 


Urine pH    (5.0-8.0)  


 


Ur Specific Gravity    (1.001-1.035)  


 


Urine Protein    (Negative)  


 


Urine Glucose (UA)    (Negative)  


 


Urine Ketones    (Negative)  


 


Urine Blood    (Negative)  


 


Urine Nitrite    (Negative)  


 


Urine Bilirubin    (Negative)  


 


Urine Urobilinogen    (<2.0)  mg/dL


 


Ur Leukocyte Esterase    (Negative)  


 


Urine RBC    (0-5)  /hpf


 


Urine WBC    (0-5)  /hpf


 


Ur Squamous Epith Cells    (0-4)  /hpf


 


Hyaline Casts    (0-2)  /lpf


 


Urine Mucus    (None)  /hpf


 


Influenza Type A (PCR)    (Not Detectd)  


 


Influenza Type B (PCR)    (Not Detectd)  


 


RSV (PCR)    (Not Detectd)  


 


SARS-CoV-2 (PCR)    (Not Detectd)  














- Radiology Data


Radiology results: report reviewed, image reviewed





Disposition


Clinical Impression: 


 New onset of congestive heart failure, Elevated troponin, Hypoxia





Disposition: ADMITTED AS IP TO THIS HOSP

## 2025-03-03 LAB
ANION GAP SERPL CALC-SCNC: 7 MMOL/L
APTT BLD: 28.2 SEC (ref 22–30)
BASOPHILS # BLD AUTO: 0.1 K/UL (ref 0–0.2)
BASOPHILS # BLD AUTO: 0.1 K/UL (ref 0–0.2)
BASOPHILS NFR BLD AUTO: 0 %
BASOPHILS NFR BLD AUTO: 1 %
BUN SERPL-SCNC: 14 MG/DL (ref 9–20)
CALCIUM SPEC-MCNC: 8.9 MG/DL (ref 8.4–10.2)
CHLORIDE SERPL-SCNC: 92 MMOL/L (ref 98–107)
CHOLEST SERPL-MCNC: 153 MG/DL (ref 0–200)
CO2 SERPL-SCNC: 41 MMOL/L (ref 22–30)
EOSINOPHIL # BLD AUTO: 0.1 K/UL (ref 0–0.7)
EOSINOPHIL # BLD AUTO: 0.1 K/UL (ref 0–0.7)
EOSINOPHIL NFR BLD AUTO: 1 %
EOSINOPHIL NFR BLD AUTO: 1 %
ERYTHROCYTE [DISTWIDTH] IN BLOOD BY AUTOMATED COUNT: 4.55 M/UL (ref 4.3–5.9)
ERYTHROCYTE [DISTWIDTH] IN BLOOD BY AUTOMATED COUNT: 4.68 M/UL (ref 4.3–5.9)
ERYTHROCYTE [DISTWIDTH] IN BLOOD: 16 % (ref 11.5–15.5)
ERYTHROCYTE [DISTWIDTH] IN BLOOD: 16.2 % (ref 11.5–15.5)
GLUCOSE SERPL-MCNC: 117 MG/DL (ref 74–99)
HCT VFR BLD AUTO: 45.3 % (ref 39–53)
HCT VFR BLD AUTO: 46.9 % (ref 39–53)
HDLC SERPL-MCNC: 43.4 MG/DL (ref 40–60)
HGB BLD-MCNC: 13.7 GM/DL (ref 13–17.5)
HGB BLD-MCNC: 14 GM/DL (ref 13–17.5)
INR PPP: 1.2 (ref ?–1.2)
LDLC SERPL CALC-MCNC: 92.6 MG/DL (ref 0–131)
LYMPHOCYTES # SPEC AUTO: 1.1 K/UL (ref 1–4.8)
LYMPHOCYTES # SPEC AUTO: 1.5 K/UL (ref 1–4.8)
LYMPHOCYTES NFR SPEC AUTO: 7 %
LYMPHOCYTES NFR SPEC AUTO: 9 %
MCH RBC QN AUTO: 30 PG (ref 25–35)
MCH RBC QN AUTO: 30.1 PG (ref 25–35)
MCHC RBC AUTO-ENTMCNC: 29.9 G/DL (ref 31–37)
MCHC RBC AUTO-ENTMCNC: 30.2 G/DL (ref 31–37)
MCV RBC AUTO: 100.1 FL (ref 80–100)
MCV RBC AUTO: 99.4 FL (ref 80–100)
MONOCYTES # BLD AUTO: 0.7 K/UL (ref 0–1)
MONOCYTES # BLD AUTO: 0.7 K/UL (ref 0–1)
MONOCYTES NFR BLD AUTO: 4 %
MONOCYTES NFR BLD AUTO: 5 %
NEUTROPHILS # BLD AUTO: 12.8 K/UL (ref 1.3–7.7)
NEUTROPHILS # BLD AUTO: 13.2 K/UL (ref 1.3–7.7)
NEUTROPHILS NFR BLD AUTO: 85 %
NEUTROPHILS NFR BLD AUTO: 86 %
PLATELET # BLD AUTO: 201 K/UL (ref 150–450)
PLATELET # BLD AUTO: 212 K/UL (ref 150–450)
POTASSIUM SERPL-SCNC: 4.4 MMOL/L (ref 3.5–5.1)
PT BLD: 12.9 SEC (ref 10–12.5)
SODIUM SERPL-SCNC: 140 MMOL/L (ref 137–145)
TRIGL SERPL-MCNC: 84.9 MG/DL (ref 0–149)
VLDLC SERPL CALC-MCNC: 16.98 MG/DL (ref 5–40)
WBC # BLD AUTO: 14.9 K/UL (ref 3.8–10.6)
WBC # BLD AUTO: 15.6 K/UL (ref 3.8–10.6)

## 2025-03-03 RX ADMIN — HEPARIN SODIUM SCH MLS/HR: 10000 INJECTION, SOLUTION INTRAVENOUS at 12:31

## 2025-03-03 RX ADMIN — HEPARIN SODIUM ONE UNIT: 1000 INJECTION, SOLUTION INTRAVENOUS; SUBCUTANEOUS at 12:32

## 2025-03-03 RX ADMIN — FUROSEMIDE SCH MG: 10 INJECTION, SOLUTION INTRAMUSCULAR; INTRAVENOUS at 20:29

## 2025-03-03 RX ADMIN — ATORVASTATIN CALCIUM SCH MG: 40 TABLET, FILM COATED ORAL at 20:29

## 2025-03-03 RX ADMIN — HEPARIN SODIUM PRN UNIT: 1000 INJECTION, SOLUTION INTRAVENOUS; SUBCUTANEOUS at 18:00

## 2025-03-03 RX ADMIN — PANTOPRAZOLE SODIUM SCH MG: 40 INJECTION, POWDER, FOR SOLUTION INTRAVENOUS at 08:03

## 2025-03-03 NOTE — CA
Transthoracic Echo Report 

 Name: Wallisch, Robert 

 MRN:    [id number] 

 Age:    64     Gender:     M 

 

 :    1960 

 Exam Date:     2025 16:33 

 Exam Location: [facility] 

 Ht (in):     71     Wt (lb):     350 

 Ordering Physician:        Katie Benoit 

 Attending/Referring Phys: 

 Technician         Tati Mejia RDCS 

 Procedure CPT: 

 Indications:       chf 

 

 Cardiac Hx: 

 Technical Quality:      Technically difficult study 

 Contrast 1:    Definity                    Total Dose (mL):      2 

 Contrast 2:                                Total Dose (mL): 

 

 MEASUREMENTS  (Male / Female) Normal Values 

 2D ECHO 

 LV Diastolic Diameter PLAX        5.2 cm                4.2 - 5.9 / 3.9 - 5.3 cm 

 LV Systolic Diameter PLAX         4.4 cm                 

 IVS Diastolic Thickness           2.0 cm                0.6 - 1.0 / 0.6 - 0.9 cm 

 LVPW Diastolic Thickness          1.9 cm                0.6 - 1.0 / 0.6 - 0.9 cm 

 LV Relative Wall Thickness        0.7                    

 RV Internal Dim ED PLAX           4.3 cm                 

 LA Systolic Diameter LX           4.3 cm                3.0 - 4.0 / 2.7 - 3.8 cm 

 LV Diastolic Volume MOD BP        116.8 cm???             67 - 155 / 56 - 104 cm??? 

 LV Systolic Volume MOD BP         54.3 cm???              22 - 58 / 19 - 49 cm??? 

 LV Ejection Fraction MOD BP       53.5 %                >= 55  % 

 LV Cardiac Index MOD BP           1850.2 cm???/min???m???      

 LV Diastolic Volume MOD 4C        119.0 cm???              

 LV Systolic Volume MOD 4C         57.8 cm???               

 LV Ejection Fraction MOD 4C       51.5 %                 

 LV Cardiac Index MOD 4C           1814.3 cm???/min???m???      

 LV Diastolic Length 4C            8.7 cm                 

 LV Systolic Length 4C             7.3 cm                 

 LV Diastolic Volume MOD 2C        99.1 cm???               

 LV Systolic Volume MOD 2C         44.7 cm???               

 LV Ejection Fraction MOD 2C       54.9 %                 

 LV Cardiac Index MOD 2C           1610.8 cm???/min???m???      

 LV Diastolic Length 2C            7.2 cm                 

 LV Systolic Length 2C             6.1 cm                 

 

 DOPPLER 

 AV Peak Velocity                  209.4 cm/s             

 AV Peak Gradient                  17.5 mmHg              

 AV Mean Velocity                  144.4 cm/s             

 AV Mean Gradient                  9.3 mmHg               

 AV Velocity Time Integral         38.6 cm                

 LVOT Peak Velocity                100.7 cm/s             

 LVOT Peak Gradient                4.1 mmHg               

 LVOT Velocity Time Integral       21.6 cm                

 MV Area PHT                       3.5 cm???                

 Mitral E Point Velocity           112.2 cm/s             

 Mitral A Point Velocity           70.7 cm/s              

 Mitral E to A Ratio               1.6                    

 MV Deceleration Time              219.5 ms               

 TR Peak Velocity                  280.2 cm/s             

 TR Peak Gradient                  31.4 mmHg              

 Right Ventricular Systolic Press  46.4 mmHg              

 

 

 FINDINGS 

 Left Ventricle 

 Left ventricular ejection fraction is estimated at 55-60 %.  Normal left  

 ventricular systolic function with no obvious regional wall motion  

 abnormalities. Left ventricular cavity size normal. Severely increased left  

 ventricular wall thickness. 

 

 Right Ventricle 

 Severe right ventricular dilatation. Moderate pulmonary hypertension. 

 

 Right Atrium 

 Right atrium not well visualized. 

 

 Left Atrium 

 Mildly increased left atrial diameter. Mildly increased left atrial area. No  

 left atrial thrombus or mass present. 

 

 Mitral Valve 

 Mitral valve thickened. Mild mitral annular calcification. Mild mitral  

 regurgitation. 

 

 Aortic Valve 

 Trileaflet aortic valve. No aortic valve stenosis or regurgitation. 

 

 Tricuspid Valve 

 Structurally normal tricuspid valve. Mild tricuspid regurgitation. 

 

 Pulmonic Valve 

 Pulmonic valve not well visualized. 

 

 Pericardium 

 No pericardial effusion. 

 

 Aorta 

 Normal size aortic root and proximal ascending aorta. 

 

 CONCLUSIONS 

 Technically difficult study.  

 Definity ECHO contrast used for improved visualization of the endocardial  

 borders (inadequate visualization of two or more contiguous segments).  

 Normal left ventricular size and systolic function with severe hypertrophy 

 Very limited Doppler study with mild mitral and tricuspid regurgitation and  

 mild pulmonary hypertension 

 Previewed by:  

 Dr. Daniela Preciado MD 

 (Electronically Signed) 

 Final Date:      2025 18:01

## 2025-03-03 NOTE — P.CRDCN
History of Present Illness


Consult date: 03/03/25


Reason for Consult (text): 





New Onset CHF


Consult reason: congestive heart failure


History of present illness: 





64-year-old male with past medical history of hypertension presents with 

complaints of abdominal pain and shortness of breath.  Cardiology consulted for 

new onset CHF.  States that shortness of breath was getting worse over the last 

2 weeks and during this time also noticed swelling in the bilateral lower e

xtremities.  Admits to orthopnea during this time, states he is only able to 

sleep in a recliner.  Reports he previously was on lisinopril and 

hydrochlorothiazide up until October of last year when he had an insurance 

problem and was no longer able to receive the medication.  Admits to being a 

smoker 1 pack a day 20 years ago.  Most recent echo in 2018 showed EF 50 to 55% 

with left atrial dilation.  Past surgical and family history unremarkable.  No 

previous cardiac procedures.





Vitals: Heart rate 94, respiratory rate 18, /83, O2 saturation 96% on 3 L


Labs: Significant for WBC 14.9, hemoglobin 14, , troponin peaked at 

0.068, BNP 2780, cholesterol 180, , and HDL 44


EKG: Sinus tachycardia


CXR: Cardiomegaly plus pulmonary vascular congestion





Past Medical History


Past Medical History: Hypertension


History of Any Multi-Drug Resistant Organisms: None Reported


Past Surgical History: No Surgical Hx Reported


Past Psychological History: No Psychological Hx Reported


Smoking Status: Former smoker


Past Alcohol Use History: Rare


Past Drug Use History: None Reported





Medications and Allergies


                                Home Medications











 Medication  Instructions  Recorded  Confirmed  Type


 


No Known Home Medications  03/02/25 03/02/25 History








                                    Allergies











Allergy/AdvReac Type Severity Reaction Status Date / Time


 


tree nut Allergy  Anaphylaxis Verified 03/02/25 16:21














Physical Exam


Vitals: 


                                   Vital Signs











  Temp Pulse Pulse Resp BP BP Pulse Ox


 


 03/03/25 08:00  99.3 F   98  18   169/70  98


 


 03/03/25 06:30       139/91 


 


 03/03/25 04:45    94  18   183/83  96


 


 03/02/25 23:10    93  18   172/91  92 L


 


 03/02/25 19:55  98.6 F   80  18   153/92  98


 


 03/02/25 17:02      169/93  


 


 03/02/25 15:49   83   18  212/122   98


 


 03/02/25 12:53  98.3 F  102 H   28 H  194/114   87 L








                                Intake and Output











 03/02/25 03/03/25 03/03/25





 22:59 06:59 14:59


 


Intake Total  960 128


 


Output Total  1000 400


 


Balance  -40 -272


 


Intake:   


 


  IV   10


 


    Invasive Line 1   10


 


  Oral  960 118


 


Output:   


 


  Urine  1000 400


 


Other:   


 


  Weight 158.757 kg 180.9 kg 














General: non toxic, no distress, morbidly obese


Cardiovascular: S1S2 reg, no murmur, 1-2+ pitting edema bilaterally up to the 

knee


Lungs: Decreased air entry bilaterally, no rhonchi, no rales, no accessory 

muscle use, mild wheezing auscultated bilaterally


Abdominal: soft,  nontender to palpation, no guarding








Results





                                 03/03/25 07:10





                                 03/03/25 07:10


                                 Cardiac Enzymes











  03/02/25 03/02/25 03/02/25 Range/Units





  14:18 14:18 17:37 


 


AST  27    (17-59)  U/L


 


Troponin I   0.057 H*  0.068 H*  (0.000-0.034)  ng/mL














  03/02/25 Range/Units





  21:33 


 


AST   (17-59)  U/L


 


Troponin I  0.057 H*  (0.000-0.034)  ng/mL








                                   Coagulation











  03/02/25 Range/Units





  14:18 


 


PT  13.6 H  (10.0-12.5)  sec


 


APTT  28.7  (22.0-30.0)  sec








                                       CBC











  03/02/25 03/03/25 Range/Units





  14:18 07:10 


 


WBC  17.5 H  14.9 H  (3.8-10.6)  k/uL


 


RBC  5.10  4.68  (4.30-5.90)  m/uL


 


Hgb  15.5  14.0  (13.0-17.5)  gm/dL


 


Hct  50.3  46.9  (39.0-53.0)  %


 


Plt Count  220  212  (150-450)  k/uL








                          Comprehensive Metabolic Panel











  03/02/25 03/03/25 Range/Units





  14:18 07:10 


 


Sodium  140  140  (137-145)  mmol/L


 


Potassium  4.6  4.4  (3.5-5.1)  mmol/L


 


Chloride  95 L  92 L  ()  mmol/L


 


Carbon Dioxide  36 H  41 H*  (22-30)  mmol/L


 


BUN  14  14  (9-20)  mg/dL


 


Creatinine  0.85  0.79  (0.66-1.25)  mg/dL


 


Glucose  136 H  117 H  (74-99)  mg/dL


 


Calcium  9.5  8.9  (8.4-10.2)  mg/dL


 


AST  27   (17-59)  U/L


 


ALT  23   (4-49)  U/L


 


Alkaline Phosphatase  83   ()  U/L


 


Total Protein  6.8   (6.3-8.2)  g/dL


 


Albumin  3.9   (3.5-5.0)  g/dL








                               Current Medications











Generic Name Dose Route Start Last Admin





  Trade Name Freq  PRN Reason Stop Dose Admin


 


Acetaminophen  650 mg  03/02/25 15:35 





  Acetaminophen Tab 325 Mg Tab  PO  





  Q6HR PRN  





  Mild Pain or Fever > 100.5  


 


Hydrocodone Bitart/Acetaminophen  1 each  03/02/25 15:35 





  Hydrocodone/Apap 5-325mg 1 Each Tab  PO  





  Q4HR PRN  





  Moderate Pain (Scale 4 to 6)  


 


Acetazolamide  250 mg  03/03/25 13:00 





  Acetazolamide 250 Mg Tab  PO  03/03/25 13:01 





  ONCE ONE  


 


Aspirin  81 mg  03/04/25 09:00 





  Aspirin 81 Mg  PO  





  DAILY [facility]  


 


Atorvastatin Calcium  40 mg  03/03/25 21:00 





  Atorvastatin 40 Mg Tab  PO  





  HS [facility]  


 


Carvedilol  6.25 mg  03/03/25 17:30 





  Carvedilol 6.25 Mg Tab  PO  





  BID-W/MEALS [facility]  


 


Furosemide  40 mg  03/03/25 21:00 





  Furosemide 10 Mg/Ml 4 Ml Vial  IV  





  Q12HR [facility]  


 


Heparin Sodium (Porcine)  4,000 unit  03/03/25 11:13 





  Heparin Sodium 1,000 Un/Ml (10ml Vl)  IV  03/03/25 11:14 





  ONCE ONE  


 


Heparin Sodium (Porcine)  0 unit  03/03/25 11:13 





  Heparin Sodium 1,000 Un/Ml (10ml Vl)  IV  





  PER PROTOCOL PRN  





  Low PTT  





  Protocol  


 


Ceftriaxone Sodium 2 gm/  50 mls @ 100 mls/hr  03/02/25 19:15  03/03/25 08:03





  Sodium Chloride  IVPB   100 mls/hr





  Q24HR THU   Administration





  Protocol  


 


Heparin Sodium/Sodium Chloride  250 mls @ 21.708 mls/hr  03/03/25 11:15 





  25,000 unit/ Sodium Chloride  IV  





  .[id number] THU  





  Protocol  





  12 UNITS/KG/HR  


 


Losartan Potassium  25 mg  03/04/25 09:00 





  Losartan 25 Mg Tab  PO  





  DAILY THU  


 


Morphine Sulfate  4 mg  03/02/25 15:35 





  Morphine Sulfate 4 Mg/Ml Syringe  IV  





  Q4HR PRN  





  Severe Pain (Scale 7 to 10)  


 


Naloxone HCl  0.2 mg  03/02/25 15:35 





  Naloxone 0.4 Mg/Ml 1 Ml Vial  IV  





  Q2M PRN  





  Opioid Reversal  


 


Ondansetron HCl  4 mg  03/02/25 15:35 





  Ondansetron 4 Mg/2 Ml Vial  IVP  





  Q8HR PRN  





  Nausea And Vomiting  


 


Pantoprazole Sodium  40 mg  03/03/25 09:00  03/03/25 08:03





  Pantoprazole 40 Mg/10 Ml Vial  IV   40 mg





  DAILY THU   Administration


 


Spironolactone  12.5 mg  03/04/25 09:00 





  Spironolactone 25 Mg Tab  PO  





  DAILY [facility]  








                                Intake and Output











 03/02/25 03/03/25 03/03/25





 22:59 06:59 14:59


 


Intake Total  960 128


 


Output Total  1000 400


 


Balance  -40 -272


 


Intake:   


 


  IV   10


 


    Invasive Line 1   10


 


  Oral  960 118


 


Output:   


 


  Urine  1000 400


 


Other:   


 


  Weight 158.757 kg 180.9 kg 








                                        





                                 03/03/25 07:10 





                                 03/03/25 07:10 











Assessment and Plan


Assessment: 





-Acute CHF exacerbation


-NSTEMI


-Hypercapnia


-Undiagnosed JUAREZ


-Morbid obesity


-Hypertension


-Hyperglycemia


-Elevated troponin


Plan: 





-Started Lipitor 40 daily and aspirin 81 mg daily


-Started Coreg 6.25 mg twice daily, losartan 25 mg daily, Aldactone 12.5 mg 

daily, and IV heparin with bolus


-Change Lasix to 40 mg IV twice daily


-One-time dose of Diamox 250 mg


-CBC and BMP already ordered, ordered magnesium, TSH, and A1c


-Echo scheduled for today


-Continue telemetry


-Strict I's and O's, daily weights, fluid restrict to 2 L

## 2025-03-04 LAB
ANION GAP SERPL CALC-SCNC: 7 MMOL/L
APTT BLD: 69.8 SEC (ref 22–30)
BASOPHILS # BLD AUTO: 0.1 K/UL (ref 0–0.2)
BASOPHILS NFR BLD AUTO: 0 %
BUN SERPL-SCNC: 13 MG/DL (ref 9–20)
CALCIUM SPEC-MCNC: 8.7 MG/DL (ref 8.4–10.2)
CHLORIDE SERPL-SCNC: 93 MMOL/L (ref 98–107)
CO2 SERPL-SCNC: 39 MMOL/L (ref 22–30)
EOSINOPHIL # BLD AUTO: 0.2 K/UL (ref 0–0.7)
EOSINOPHIL NFR BLD AUTO: 2 %
ERYTHROCYTE [DISTWIDTH] IN BLOOD BY AUTOMATED COUNT: 4.54 M/UL (ref 4.3–5.9)
ERYTHROCYTE [DISTWIDTH] IN BLOOD: 16 % (ref 11.5–15.5)
GLUCOSE SERPL-MCNC: 137 MG/DL (ref 74–99)
HCT VFR BLD AUTO: 45.2 % (ref 39–53)
HGB BLD-MCNC: 13.4 GM/DL (ref 13–17.5)
INR PPP: 1.2 (ref ?–1.2)
LYMPHOCYTES # SPEC AUTO: 1.2 K/UL (ref 1–4.8)
LYMPHOCYTES NFR SPEC AUTO: 9 %
MAGNESIUM SPEC-SCNC: 1.9 MG/DL (ref 1.6–2.3)
MCH RBC QN AUTO: 29.5 PG (ref 25–35)
MCHC RBC AUTO-ENTMCNC: 29.6 G/DL (ref 31–37)
MCV RBC AUTO: 99.5 FL (ref 80–100)
MONOCYTES # BLD AUTO: 0.7 K/UL (ref 0–1)
MONOCYTES NFR BLD AUTO: 5 %
NEUTROPHILS # BLD AUTO: 11.6 K/UL (ref 1.3–7.7)
NEUTROPHILS NFR BLD AUTO: 84 %
PLATELET # BLD AUTO: 181 K/UL (ref 150–450)
POTASSIUM SERPL-SCNC: 4.1 MMOL/L (ref 3.5–5.1)
PT BLD: 13 SEC (ref 10–12.5)
SODIUM SERPL-SCNC: 139 MMOL/L (ref 137–145)
WBC # BLD AUTO: 13.9 K/UL (ref 3.8–10.6)

## 2025-03-04 RX ADMIN — FUROSEMIDE SCH MG: 10 INJECTION, SOLUTION INTRAMUSCULAR; INTRAVENOUS at 16:30

## 2025-03-04 RX ADMIN — ASPIRIN 81 MG CHEWABLE TABLET SCH MG: 81 TABLET CHEWABLE at 07:58

## 2025-03-04 RX ADMIN — LOSARTAN POTASSIUM SCH MG: 25 TABLET, FILM COATED ORAL at 07:58

## 2025-03-04 RX ADMIN — SPIRONOLACTONE SCH MG: 25 TABLET, FILM COATED ORAL at 07:58

## 2025-03-04 NOTE — P.PN
Subjective


Progress Note Date: 03/03/25











64-year-old male who presents to the emergency department for abdominal pain and

shortness of breath.  States that he has been dealing with shortness of breath 

for the last 2 weeks.  He has also started to notice swelling in his lower 

extremities and abdomen.  Today he felt a pain in his left upper quadrant that 

occurred about 2 hours prior to arrival.  This has since persisted, prompting 

him to come here for evaluation.  Denies any nausea or vomiting.  Denies any 

changes in bowel or bladder habits.  Denies any history of respiratory or 

cardiac problems.


Patient hypoxic on arrival with an oxygen of 87%.  This improved with 2 L via 

nasal cannula.  


Lab work demonstrates leukocytosis with a white blood cell count of 17.5.  D-

dimer negative.  Lactic acid elevated at 2.3.  BNP elevated at 2780 and troponin

elevated at 0.057.  Patient denies a history of CHF.  


Chest x-ray demonstrates cardiomegaly with pulmonary vascular congestion. 


COVID, influenza, and RSV testing negative.  


Urinalysis negative for signs of infection.  


Given his abdominal pain ER attempted to get a CT scan on the patient.  However,

he could not comfortably lay flat for the testing. 





3/3/2025


Patient is seen in follow-up today with cardiology following.  Patient is being 

started on IV Lasix and was given a dose of Diamox.  2D echo ordered and pending

and patient denies any previous history of heart failure.  Patient reports 

significant swelling although is also morbidly obese with significantly large 

body habitus.  Patient currently living in a skilled nursing house and is noncompliant 

with any medication and outpatient follow-up.  Patient is short of breath and 

continues to be short of breath with conversation and minimal exertion.  Patient

will need outpatient evaluation by pulmonary for sleep apnea





Review of systems:


Constitutional: No reports of fatigue, fever, or chills


Cardiovascular: No reports of chest pain or palpitations


Respiratory: No reports of shortness of breath or cough


GI: No reports of nausea, vomiting, or diarrhea


: No reports of dysuria or retention


Neurovascular: No reports of weakness or numbness





All medications have been reviewed





Physical exam:





General appearance: alert, in no apparent distress, well-developed, morbidly 

obese, unkempt, ill-appearing, appears older than stated age


Head exam: Present: atraumatic, normocephalic, normal inspection


Respiratory exam: Present: decreased breath sounds, prolonged expiratory, some 

faint crackles noted at the bases


Cardiovascular Exam: Present: normal rhythm, tachycardia


GI/Abdominal exam: Present: soft, morbidly obese distended, tenderness 

(diffuse), normal bowel sounds


Extremities exam: Present: other (Bilateral lower extremity swelling nonpitting


Neurological exam: Present: alert, oriented X3, CN II-XII intact


Psychiatric exam: Present: normal affect, normal mood


Skin exam: Present: warm, dry, intact, normal color.  Absent: rash








Assessment:





1.  New onset CHF; acute exacerbation


-Chest x-ray reveals cardiomegaly with pulmonary vascular congestion; BNP 

elevated at 2780


-Patient is not significantly clinically fluid overloaded, appears extremely 

large body habitus as well


-Continue current medical chronic telemetry; monitor EKG and trend troponin


-Patient is continued on IV Lasix twice daily; monitor strict JANET's, daily 

weights, renal function electrolytes


-2D echo ordered and pending; cardiology following





2.  Leukocytosis/possible UTI; lactic acid is elevated at 2.3; no signs of 

infection; chest x-ray is negative for any acute process


-UA reveals small amount of leukocyte esterase


-Will start patient empirically on IV Rocephin and repeat CBC; procalcitonin 

pending although suspicion for UTI is low





3.  Elevated troponin; likely related to hypoxia 


- initial troponin is at 0.057; we will trend with plans to initiate IV heparin 

infusion if troponin continues to trend up





4.  Hypertension; lisinopril 20 mg daily





5.  Morbid obesity with a BMI of 55.6; possible obesity hypoventilation 

syndrome, will need outpatient testing








GI prophylaxis


DVT prophylaxis; SCDs/subcu heparin


CODE STATUS; full code








The impression and plan of care has been dictated by Amanda Eastman, Nurse 

Practitioner as directed.





Dr. Bobo MD


I have performed a history and examination and MDM of this patient, discussed 

the same with the dictator, and  agree with the dictator's assessment and plan 

as written ,documented as a scribe. Based on total visit time,  I have performed

more than 50% of the visit.











Objective





- Vital Signs


Vital signs: 


                                   Vital Signs











Temp  99.3 F   03/03/25 08:00


 


Pulse  98   03/03/25 08:00


 


Resp  18   03/03/25 08:00


 


BP  169/70   03/03/25 08:00


 


Pulse Ox  98   03/03/25 08:00


 


FiO2      








                                 Intake & Output











 03/02/25 03/03/25 03/03/25





 18:59 06:59 18:59


 


Intake Total  960 128


 


Output Total  1000 400


 


Balance  -40 -272


 


Weight 158.757 kg 180.9 kg 


 


Intake:   


 


  IV   10


 


    Invasive Line 1   10


 


  Oral  960 118


 


Output:   


 


  Urine  1000 400














- Labs


CBC & Chem 7: 


                                 03/03/25 14:18





                                 03/03/25 07:10


Labs: 


                  Abnormal Lab Results - Last 24 Hours (Table)











  03/02/25 03/02/25 03/02/25 Range/Units





  14:18 14:18 14:18 


 


WBC  17.5 H    (3.8-10.6)  k/uL


 


MCV     (80.0-100.0)  fL


 


MCHC  30.7 L    (31.0-37.0)  g/dL


 


RDW  16.2 H    (11.5-15.5)  %


 


Neutrophils #  14.8 H    (1.3-7.7)  k/uL


 


PT   13.6 H   (10.0-12.5)  sec


 


INR   1.3 H   (<1.2)  


 


Chloride     ()  mmol/L


 


Carbon Dioxide     (22-30)  mmol/L


 


Glucose     (74-99)  mg/dL


 


Plasma Lactic Acid Otto     (0.7-2.0)  mmol/L


 


Troponin I     (0.000-0.034)  ng/mL


 


C-Reactive Protein     (<1.0)  mg/dL


 


Urine Protein    2+ H  (Negative)  


 


Urine Ketones    Trace H  (Negative)  


 


Ur Leukocyte Esterase    Small H  (Negative)  


 


Urine WBC    11 H  (0-5)  /hpf


 


Urine Mucus    Few H  (None)  /hpf














  03/02/25 03/02/25 03/02/25 Range/Units





  14:18 14:18 14:18 


 


WBC     (3.8-10.6)  k/uL


 


MCV     (80.0-100.0)  fL


 


MCHC     (31.0-37.0)  g/dL


 


RDW     (11.5-15.5)  %


 


Neutrophils #     (1.3-7.7)  k/uL


 


PT     (10.0-12.5)  sec


 


INR     (<1.2)  


 


Chloride  95 L    ()  mmol/L


 


Carbon Dioxide  36 H    (22-30)  mmol/L


 


Glucose  136 H    (74-99)  mg/dL


 


Plasma Lactic Acid Otto   2.3 H*   (0.7-2.0)  mmol/L


 


Troponin I    0.057 H*  (0.000-0.034)  ng/mL


 


C-Reactive Protein     (<1.0)  mg/dL


 


Urine Protein     (Negative)  


 


Urine Ketones     (Negative)  


 


Ur Leukocyte Esterase     (Negative)  


 


Urine WBC     (0-5)  /hpf


 


Urine Mucus     (None)  /hpf














  03/02/25 03/02/25 03/03/25 Range/Units





  17:37 21:33 07:10 


 


WBC    14.9 H  (3.8-10.6)  k/uL


 


MCV    100.1 H  (80.0-100.0)  fL


 


MCHC    29.9 L  (31.0-37.0)  g/dL


 


RDW    16.0 H  (11.5-15.5)  %


 


Neutrophils #    12.8 H  (1.3-7.7)  k/uL


 


PT     (10.0-12.5)  sec


 


INR     (<1.2)  


 


Chloride     ()  mmol/L


 


Carbon Dioxide     (22-30)  mmol/L


 


Glucose     (74-99)  mg/dL


 


Plasma Lactic Acid Otto     (0.7-2.0)  mmol/L


 


Troponin I  0.068 H*  0.057 H*   (0.000-0.034)  ng/mL


 


C-Reactive Protein     (<1.0)  mg/dL


 


Urine Protein     (Negative)  


 


Urine Ketones     (Negative)  


 


Ur Leukocyte Esterase     (Negative)  


 


Urine WBC     (0-5)  /hpf


 


Urine Mucus     (None)  /hpf














  03/03/25 Range/Units





  07:10 


 


WBC   (3.8-10.6)  k/uL


 


MCV   (80.0-100.0)  fL


 


MCHC   (31.0-37.0)  g/dL


 


RDW   (11.5-15.5)  %


 


Neutrophils #   (1.3-7.7)  k/uL


 


PT   (10.0-12.5)  sec


 


INR   (<1.2)  


 


Chloride  92 L  ()  mmol/L


 


Carbon Dioxide  41 H*  (22-30)  mmol/L


 


Glucose  117 H  (74-99)  mg/dL


 


Plasma Lactic Acid Otto   (0.7-2.0)  mmol/L


 


Troponin I   (0.000-0.034)  ng/mL


 


C-Reactive Protein  2.9 H  (<1.0)  mg/dL


 


Urine Protein   (Negative)  


 


Urine Ketones   (Negative)  


 


Ur Leukocyte Esterase   (Negative)  


 


Urine WBC   (0-5)  /hpf


 


Urine Mucus   (None)  /hpf

## 2025-03-04 NOTE — P.PN
Subjective


64-year-old male with past medical history of hypertension presents with 

complaints of abdominal pain and shortness of breath.  Cardiology consulted for 

new onset CHF.  States that shortness of breath was getting worse over the last 

2 weeks and during this time also noticed swelling in the bilateral lower 

extremities.  Admits to orthopnea during this time, states he is only able to 

sleep in a recliner.  Reports he previously was on lisinopril and 

hydrochlorothiazide up until October of last year when he had an insurance 

problem and was no longer able to receive the medication.  Admits to being a 

smoker 1 pack a day 20 years ago.  Most recent echo in 2018 showed EF 50 to 55% 

with left atrial dilation.  Past surgical and family history unremarkable.  No 

previous cardiac procedures.





Vitals: Heart rate 94, respiratory rate 18, /83, O2 saturation 96% on 3 L


Labs: Significant for WBC 14.9, hemoglobin 14, , troponin peaked at 0.

068, BNP 2780, cholesterol 180, , and HDL 44


EKG: Sinus tachycardia


CXR: Cardiomegaly plus pulmonary vascular congestion





3/4


Patient seen and examined in the cardiac stepdown unit.  No significant events. 

Yesterday, patient underwent echo which showed EF 55 to 60% with no obvious wall

motion abnormalities.  Overnight patient had bursts of PVT's lasting less than 

30 seconds.  TSH and magnesium ordered yesterday were within normal limits, A1c 

was 7.9.  Blood pressures 122/49, heart rate 75, pulse ox 91% on 3 L.  Repeat 

blood work reveals WBC 13.9, hemoglobin 13.4, sodium 139, potassium 4.1, 

chloride 93, bicarb 39, glucose 137, and TSH 1.19.  Patient continues to have 

good urine output, documented that he is put out at least 1 L but after speaking

with nursing staff they report the patient "does not aim well "and they believe 

his urine output is greater than what is actually recorded.





REVIEW OF SYSTEMS


At the time of my exam:


CONSTITUTIONAL: Denies fever or chills.


CARDIOVASCULAR: no chest pain, + shortness of breath, no orthopnea, PND or 

palpitations.


RESPIRATORY: Denies cough. 


GASTROINTESTINAL: Denies abdominal pain, diarrhea, constipation, nausea or 

vomiting.


MUSCULOSKELETAL: Denies myalgias.


NEUROLOGIC: Denies numbness, tingling or weakness.


ENDOCRINE: Denies fatigue, weight change,  polydipsia or polyurina.


GENITOURINARY: Denies burning, hematuria or urgency with micturation.


HEMATOLOGIC: Denies history of anemia or bleeding. 





PHYSICAL EXAM


Vital signs reviewed.


General: non toxic, no distress, morbidly obese


HEENT: Head is normocephalic. Pupils are equal, round. Sclerae anicteric. Mucous

membranes of the mouth are moist.  No JVD. No carotid bruit.


Cardiovascular: S1S2 reg, no murmur, 1+ pitting edema bilaterally up to the 

knee, worse in left leg


Lungs: Decreased air entry bilaterally, no rhonchi, no rales, no accessory 

muscle use, mild wheezing auscultated bilaterally


Abdominal: soft,  nontender to palpation, no guarding


NEUROLOGIC EXAMINATION: Patient is awake, alert and oriented x3. 





ASSESSMENT


-Acute CHF exacerbation


-NSTEMI


-Hypercapnia


-Undiagnosed JUAREZ


-Morbid obesity


-Hypertension


-Hyperglycemia


-Elevated troponin





PLAN


-Continue Lipitor 40 daily and aspirin 81 mg daily


-Continue Coreg 6.25 mg twice daily, losartan 25 mg daily, Aldactone 12.5 mg 

daily, and IV heparin with bolus


-Change Lasix to 40 mg IV 3 times daily


-CBC and BMP


-Continue telemetry


-Strict I's and O's, daily weights, fluid restrict to 2 L


-Ordered Farxiga 10 mg





Objective





- Vital Signs


Vital signs: 


                                   Vital Signs











Temp  98.4 F   03/04/25 12:00


 


Pulse  84   03/04/25 12:00


 


Resp  18   03/04/25 12:00


 


BP  156/75   03/04/25 12:00


 


Pulse Ox  95   03/04/25 12:00


 


FiO2      








                                 Intake & Output











 03/03/25 03/04/25 03/04/25





 18:59 06:59 18:59


 


Intake Total 852.310 0739 374.31


 


Output Total 1500 604 


 


Balance -967.833 488 374.31


 


Weight 180.9 kg 178.4 kg 


 


Intake:   


 


  IV 20 10 


 


    Invasive Line 1 20 10 


 


  Intake, IV Titration 54.167  194.31





  Amount   


 


    Heparin Sod,Pork in 0.45% 54.167  194.31





    NaCl 25,000 unit In 0.45   





    % NaCl 1 250ml.bag @ 5.   





    5279 UNITS/KG/HR 10 mls/   





    hr IV .Q24H [facility] Rx#:   





    [id number]   


 


  Oral 458 1082 180


 


Output:   


 


  Urine 1500 604 


 


Other:   


 


  Voiding Method  Toilet Toilet














- Labs


CBC & Chem 7: 


                                 03/04/25 06:32





                                 03/04/25 06:32


Labs: 


                  Abnormal Lab Results - Last 24 Hours (Table)











  03/03/25 03/03/25 03/03/25 Range/Units





  07:10 14:18 17:22 


 


WBC   15.6 H   (3.8-10.6)  k/uL


 


MCHC   30.2 L   (31.0-37.0)  g/dL


 


RDW   16.2 H   (11.5-15.5)  %


 


Neutrophils #   13.2 H   (1.3-7.7)  k/uL


 


PT     (10.0-12.5)  sec


 


INR     (<1.2)  


 


APTT    38.3 H  (22.0-30.0)  sec


 


Chloride     ()  mmol/L


 


Carbon Dioxide     (22-30)  mmol/L


 


Glucose     (74-99)  mg/dL


 


Hemoglobin A1c  7.9 H    (<=6.0)  %














  03/03/25 03/04/25 03/04/25 Range/Units





  23:56 06:32 06:32 


 


WBC     (3.8-10.6)  k/uL


 


MCHC     (31.0-37.0)  g/dL


 


RDW     (11.5-15.5)  %


 


Neutrophils #     (1.3-7.7)  k/uL


 


PT    13.0 H  (10.0-12.5)  sec


 


INR    1.2 H  (<1.2)  


 


APTT  57.7 H   69.8 H  (22.0-30.0)  sec


 


Chloride   93 L   ()  mmol/L


 


Carbon Dioxide   39 H   (22-30)  mmol/L


 


Glucose   137 H   (74-99)  mg/dL


 


Hemoglobin A1c     (<=6.0)  %














  03/04/25 03/04/25 Range/Units





  06:32 12:20 


 


WBC  13.9 H   (3.8-10.6)  k/uL


 


MCHC  29.6 L   (31.0-37.0)  g/dL


 


RDW  16.0 H   (11.5-15.5)  %


 


Neutrophils #  11.6 H   (1.3-7.7)  k/uL


 


PT    (10.0-12.5)  sec


 


INR    (<1.2)  


 


APTT   52.2 H  (22.0-30.0)  sec


 


Chloride    ()  mmol/L


 


Carbon Dioxide    (22-30)  mmol/L


 


Glucose    (74-99)  mg/dL


 


Hemoglobin A1c    (<=6.0)  %

## 2025-03-05 LAB
ANION GAP SERPL CALC-SCNC: 6 MMOL/L
BASOPHILS # BLD AUTO: 0 K/UL (ref 0–0.2)
BASOPHILS NFR BLD AUTO: 0 %
BUN SERPL-SCNC: 13 MG/DL (ref 9–20)
CALCIUM SPEC-MCNC: 8.6 MG/DL (ref 8.4–10.2)
CHLORIDE SERPL-SCNC: 91 MMOL/L (ref 98–107)
CO2 BLDA-SCNC: 43 MMOL/L (ref 19–24)
CO2 SERPL-SCNC: 41 MMOL/L (ref 22–30)
EOSINOPHIL # BLD AUTO: 0.1 K/UL (ref 0–0.7)
EOSINOPHIL NFR BLD AUTO: 1 %
ERYTHROCYTE [DISTWIDTH] IN BLOOD BY AUTOMATED COUNT: 4.44 M/UL (ref 4.3–5.9)
ERYTHROCYTE [DISTWIDTH] IN BLOOD: 16.2 % (ref 11.5–15.5)
GLUCOSE SERPL-MCNC: 126 MG/DL (ref 74–99)
HCO3 BLDA-SCNC: 41 MMOL/L (ref 21–25)
HCO3 BLDV-SCNC: 41 MMOL/L (ref 24–28)
HCT VFR BLD AUTO: 44 % (ref 39–53)
HGB BLD-MCNC: 13.3 GM/DL (ref 13–17.5)
LYMPHOCYTES # SPEC AUTO: 1 K/UL (ref 1–4.8)
LYMPHOCYTES NFR SPEC AUTO: 7 %
MCH RBC QN AUTO: 29.9 PG (ref 25–35)
MCHC RBC AUTO-ENTMCNC: 30.1 G/DL (ref 31–37)
MCV RBC AUTO: 99.1 FL (ref 80–100)
MONOCYTES # BLD AUTO: 0.8 K/UL (ref 0–1)
MONOCYTES NFR BLD AUTO: 6 %
NEUTROPHILS # BLD AUTO: 11.2 K/UL (ref 1.3–7.7)
NEUTROPHILS NFR BLD AUTO: 85 %
PCO2 BLDA: 76 MMHG (ref 35–45)
PCO2 BLDV: 83 MMHG (ref 37–51)
PH BLDA: 7.34 [PH] (ref 7.35–7.45)
PH BLDV: 7.3 [PH] (ref 7.31–7.41)
PLATELET # BLD AUTO: 161 K/UL (ref 150–450)
PO2 BLDA: 78 MMHG (ref 83–108)
POTASSIUM SERPL-SCNC: 4 MMOL/L (ref 3.5–5.1)
SODIUM SERPL-SCNC: 138 MMOL/L (ref 137–145)
WBC # BLD AUTO: 13.2 K/UL (ref 3.8–10.6)

## 2025-03-05 RX ADMIN — DAPAGLIFLOZIN SCH MG: 10 TABLET, FILM COATED ORAL at 09:01

## 2025-03-05 NOTE — P.PN
Subjective


Patient was seen and examined with the resident


I agree with the documented assessment and plan





Plan


Goal is to diurese patient.  Will do complete nephron blockade with 1 dose of 

Diamox, metolazone, Lasix 40 mg IV 3 times daily, Farxiga.


He would benefit from CPAP machine.  He has undiagnosed sleep apnea








64-year-old male with past medical history of hypertension presents with 

complaints of abdominal pain and shortness of breath.  Cardiology consulted for 

new onset CHF.  States that shortness of breath was getting worse over the last 

2 weeks and during this time also noticed swelling in the bilateral lower 

extremities.  Admits to orthopnea during this time, states he is only able to 

sleep in a recliner.  Reports he previously was on lisinopril and 

hydrochlorothiazide up until October of last year when he had an insurance p

[employer] and was no longer able to receive the medication.  Admits to being a 

smoker 1 pack a day 20 years ago.  Most recent echo in 2018 showed EF 50 to 55% 

with left atrial dilation.  Past surgical and family history unremarkable.  No 

previous cardiac procedures.





Vitals: Heart rate 94, respiratory rate 18, /83, O2 saturation 96% on 3 L


Labs: Significant for WBC 14.9, hemoglobin 14, , troponin peaked at 

0.068, BNP 2780, cholesterol 180, , and HDL 44


EKG: Sinus tachycardia


CXR: Cardiomegaly plus pulmonary vascular congestion





3/4


Patient seen and examined in the cardiac stepdown unit.  No significant events. 

Yesterday, patient underwent echo which showed EF 55 to 60% with no obvious wall

motion abnormalities.  Overnight patient had bursts of PVT's lasting less than 

30 seconds.  TSH and magnesium ordered yesterday were within normal limits, A1c 

was 7.9.  Blood pressures 122/49, heart rate 75, pulse ox 91% on 3 L.  Repeat 

blood work reveals WBC 13.9, hemoglobin 13.4, sodium 139, potassium 4.1, 

chloride 93, bicarb 39, glucose 137, and TSH 1.19.  Patient continues to have 

good urine output, documented that he is put out at least 1 L but after speaking

with nursing staff they report the patient "does not aim well "and they believe 

his urine output is greater than what is actually recorded.





3/5


Patient seen and examined in the cardiac stepdown unit.  No significant 

overnight events.  Patient had a urine output of 2 L in the last 24 hours, 

nurses continue to mention that he has trouble aiming and believe the urine 

output is greater than is what is actually being recorded.  Heart rate 62, 

respiratory rate 18, blood pressure 139/77, O2 saturation 98% on 3 L.  Repeat 

blood work reveals WBC 13.2, hemoglobin 13.3, sodium 138, bicarb 41.  Patient 

continues to have lower extremity swelling with no marked improvement from 

previous days.





REVIEW OF SYSTEMS


At the time of my exam:


CONSTITUTIONAL: Denies fever or chills.


CARDIOVASCULAR: no chest pain, + shortness of breath, no orthopnea, PND or 

palpitations.


RESPIRATORY: Denies cough. 


GASTROINTESTINAL: Denies abdominal pain, diarrhea, constipation, nausea or 

vomiting.


MUSCULOSKELETAL: Denies myalgias.


NEUROLOGIC: Denies numbness, tingling or weakness.


ENDOCRINE: Denies fatigue, weight change,  polydipsia or polyurina.


GENITOURINARY: Denies burning, hematuria or urgency with micturation.


HEMATOLOGIC: Denies history of anemia or bleeding. 





PHYSICAL EXAM


Vital signs reviewed.


General: non toxic, no distress, morbidly obese


HEENT: Head is normocephalic. Pupils are equal, round. Sclerae anicteric. Mucous

membranes of the mouth are moist.  No JVD. No carotid bruit.


Cardiovascular: S1S2 reg, no murmur, 2-3+ pitting edema bilaterally up to the 

knee, worse in left leg


Lungs: Decreased air entry bilaterally, no rhonchi, no rales, no accessory 

muscle use, mild wheezing auscultated bilaterally


Abdominal: soft,  nontender to palpation, no guarding


NEUROLOGIC EXAMINATION: Patient is awake, alert and oriented x3. 





ASSESSMENT


-Acute CHF exacerbation


-NSTEMI


-Elevated troponin


-Hypercapnia


-Undiagnosed JUAREZ


-Morbid obesity


-Hypertension


-Hyperglycemia








PLAN


-Continue Lipitor 40 daily and aspirin 81 mg daily


-Continue Coreg 6.25 mg twice daily, losartan 25 mg daily, and IV heparin with 

bolus


-Continue Lasix to 40 mg IV 3 times daily


-One-time dose of Diamox 250 mg


-Increase Aldactone to 25 mg daily


-Metolazone 5 mg daily, to be given 30 minutes after Lasix


-Ordered CPAP for likely JUAREZ, also in the setting of elevated bicarb


-CBC and BMP and magnesium


-Continue telemetry


-Strict I's and O's, daily weights, fluid restrict to 2 L


-Continue Farxiga 10 mg





Objective





- Vital Signs


Vital signs: 


                                   Vital Signs











Temp  97.9 F   03/05/25 12:10


 


Pulse  62   03/05/25 12:10


 


Resp  18   03/05/25 12:10


 


BP  139/77   03/05/25 12:10


 


Pulse Ox  98   03/05/25 12:10


 


FiO2      








                                 Intake & Output











 03/04/25 03/05/25 03/05/25





 18:59 06:59 18:59


 


Intake Total 734.31 250 118


 


Balance 734.31 250 118


 


Weight  177.6 kg 


 


Intake:   


 


  Intake, IV Titration 194.31 250 





  Amount   


 


    Heparin Sod,Pork in 0.45% 194.31 250 





    NaCl 25,000 unit In 0.45   





    % NaCl 1 250ml.bag @ 5.   





    5279 UNITS/KG/HR 10 mls/   





    hr IV .Q24H formerly [facility] Rx#:   





    [id number]   


 


  Oral 540  118


 


Other:   


 


  Voiding Method Toilet Toilet Toilet


 


  # Voids 2 1 














- Labs


CBC & Chem 7: 


                                 03/05/25 07:58





                                 03/05/25 07:58


Labs: 


                  Abnormal Lab Results - Last 24 Hours (Table)











  03/05/25 03/05/25 03/05/25 Range/Units





  07:58 07:58 07:58 


 


WBC   13.2 H   (3.8-10.6)  k/uL


 


MCHC   30.1 L   (31.0-37.0)  g/dL


 


RDW   16.2 H   (11.5-15.5)  %


 


Neutrophils #   11.2 H   (1.3-7.7)  k/uL


 


APTT  56.3 H    (22.0-30.0)  sec


 


Chloride    91 L  ()  mmol/L


 


Carbon Dioxide    41 H*  (22-30)  mmol/L


 


Glucose    126 H  (74-99)  mg/dL

## 2025-03-05 NOTE — P.PN
Subjective


Progress Note Date: 03/04/25











64-year-old male who presents to the emergency department for abdominal pain and

shortness of breath.  States that he has been dealing with shortness of breath 

for the last 2 weeks.  He has also started to notice swelling in his lower 

extremities and abdomen.  Today he felt a pain in his left upper quadrant that 

occurred about 2 hours prior to arrival.  This has since persisted, prompting 

him to come here for evaluation.  Denies any nausea or vomiting.  Denies any 

changes in bowel or bladder habits.  Denies any history of respiratory or 

cardiac problems.


Patient hypoxic on arrival with an oxygen of 87%.  This improved with 2 L via 

nasal cannula.  


Lab work demonstrates leukocytosis with a white blood cell count of 17.5.  D-

dimer negative.  Lactic acid elevated at 2.3.  BNP elevated at 2780 and troponin

elevated at 0.057.  Patient denies a history of CHF.  


Chest x-ray demonstrates cardiomegaly with pulmonary vascular congestion. 


COVID, influenza, and RSV testing negative.  


Urinalysis negative for signs of infection.  


Given his abdominal pain ER attempted to get a CT scan on the patient.  However,

he could not comfortably lay flat for the testing. 





3/3/2025


Patient is seen in follow-up today with cardiology following.  Patient is being 

started on IV Lasix and was given a dose of Diamox.  2D echo ordered and pending

and patient denies any previous history of heart failure.  Patient reports 

significant swelling although is also morbidly obese with significantly large 

body habitus.  Patient currently living [facility] a [facility]iary house and is noncompliant 

with any medication and outpatient follow-up.  Patient is short of breath and 

continues to be short of breath with conversation and minimal exertion.  Patient

will need outpatient evaluation by pulmonary for sleep apnea





3/4/2025


Patient is seen in follow-up today continues on IV Lasix with  cardiology 

following.  Lasix is being increased and adjustments to medications including 

adding Farxiga.  Patient and family inquiring when patient can potentially go 

home.  Patient is currently maintained on oxygen and will likely need oxygen on 

discharge as patient desats quickly into the high 70s and low 80s.  Encouraged 

increase activity as tolerated





Review of systems:


Constitutional: No reports of fatigue, fever, or chills


Cardiovascular: No reports of chest pain or palpitations


Respiratory: No reports of worsening shortness of breath or cough


GI: No reports of nausea, vomiting, or diarrhea


: No reports of dysuria or retention


Neurovascular: No reports of weakness or numbness





All medications have been reviewed





Physical exam:





General appearance: alert, in no apparent distress, well-developed, morbidly 

obese, unkempt, ill-appearing, appears older than stated age, morbidly obese


Head exam: Present: atraumatic, normocephalic, normal inspection


Respiratory exam: Present: decreased breath sounds, prolonged expiratory, some 

faint crackles noted at the bases


Cardiovascular Exam: Present: normal rhythm, tachycardia


GI/Abdominal exam: Present: soft, morbidly obese distended, tenderness 

(diffuse), normal bowel sounds


Extremities exam: Present: other (Bilateral lower extremity swelling nonpitting


Neurological exam: Present: alert, oriented X3, CN II-XII intact


Psychiatric exam: Present: normal affect, normal mood


Skin exam: Present: warm, dry, intact, normal color.  Absent: rash








Assessment:





1.  New onset CHF; acute exacerbation


-Chest x-ray reveals cardiomegaly with pulmonary vascular congestion; BNP 

elevated at 2780


-Patient is not significantly clinically fluid overloaded, appears extremely 

large body habitus as well


-Continue current medical chronic telemetry; monitor EKG and trend troponin


-Patient is continued on IV Lasix twice daily; monitor strict JANET's, daily 

weights, renal function electrolytes


-2D echo revealed a normal EF of 55 to 60%





2.  Leukocytosis/possible UTI; lactic acid is elevated at 2.3; no signs of 

infection; chest x-ray is negative for any acute process


-UA reveals small amount of leukocyte esterase


-Will start patient empirically on IV Rocephin and repeat CBC; procalcitonin 

pending although suspicion for UTI is low





3.  Elevated troponin; type II secondary to supply demand mismatch 


- initial troponin is at 0.057; cardiology is following





4.  Hypertension; lisinopril 20 mg daily





5.  Morbid obesity with a BMI of 55.6; possible obesity hypoventilation 

syndrome, will need outpatient testing








GI prophylaxis


DVT prophylaxis; SCDs/subcu heparin


CODE STATUS; full code








The impression and plan of care has been dictated by Amanda Eastman, Nurse 

Practitioner as directed.





Dr. Bobo MD


I have performed a history and examination and MDM of this patient, discussed 

the same with the dictator, and  agree with the dictator's assessment and plan 

as written ,documented as a scribe. Based on total visit time,  I have performed

more than 50% of the visit.











Objective





- Vital Signs


Vital signs: 


                                   Vital Signs











Temp  98.6 F   03/04/25 07:55


 


Pulse  75   03/04/25 07:55


 


Resp  19   03/04/25 07:55


 


BP  122/49   03/04/25 07:55


 


Pulse Ox  91 L  03/04/25 07:56


 


FiO2      








                                 Intake & Output











 03/03/25 03/04/25 03/04/25





 18:59 06:59 18:59


 


Intake Total 784.682 1423 374.31


 


Output Total 1500 604 


 


Balance -967.833 488 374.31


 


Weight 180.9 kg 178.4 kg 


 


Intake:   


 


  IV 20 10 


 


    Invasive Line 1 20 10 


 


  Intake, IV Titration 54.167  194.31





  Amount   


 


    Heparin Sod,Pork in 0.45% 54.167  194.31





    NaCl 25,000 unit In 0.45   





    % NaCl 1 250ml.bag @ 5.   





    5279 UNITS/KG/HR 10 mls/   





    hr IV .Q24H Count includes the [facility] Rx#:   





    [id number]   


 


  Oral 458 1082 180


 


Output:   


 


  Urine 1500 604 


 


Other:   


 


  Voiding Method  Toilet 














- Labs


CBC & Chem 7: 


                                 03/04/25 06:32





                                 03/04/25 06:32


Labs: 


                  Abnormal Lab Results - Last 24 Hours (Table)











  03/03/25 03/03/25 03/03/25 Range/Units





  07:10 11:28 14:18 


 


WBC    15.6 H  (3.8-10.6)  k/uL


 


MCHC    30.2 L  (31.0-37.0)  g/dL


 


RDW    16.2 H  (11.5-15.5)  %


 


Neutrophils #    13.2 H  (1.3-7.7)  k/uL


 


PT   12.9 H   (10.0-12.5)  sec


 


INR   1.2 H   (<1.2)  


 


APTT     (22.0-30.0)  sec


 


Chloride     ()  mmol/L


 


Carbon Dioxide     (22-30)  mmol/L


 


Glucose     (74-99)  mg/dL


 


Hemoglobin A1c  7.9 H    (<=6.0)  %














  03/03/25 03/03/25 03/04/25 Range/Units





  17:22 23:56 06:32 


 


WBC     (3.8-10.6)  k/uL


 


MCHC     (31.0-37.0)  g/dL


 


RDW     (11.5-15.5)  %


 


Neutrophils #     (1.3-7.7)  k/uL


 


PT     (10.0-12.5)  sec


 


INR     (<1.2)  


 


APTT  38.3 H  57.7 H   (22.0-30.0)  sec


 


Chloride    93 L  ()  mmol/L


 


Carbon Dioxide    39 H  (22-30)  mmol/L


 


Glucose    137 H  (74-99)  mg/dL


 


Hemoglobin A1c     (<=6.0)  %














  03/04/25 03/04/25 Range/Units





  06:32 06:32 


 


WBC   13.9 H  (3.8-10.6)  k/uL


 


MCHC   29.6 L  (31.0-37.0)  g/dL


 


RDW   16.0 H  (11.5-15.5)  %


 


Neutrophils #   11.6 H  (1.3-7.7)  k/uL


 


PT  13.0 H   (10.0-12.5)  sec


 


INR  1.2 H   (<1.2)  


 


APTT  69.8 H   (22.0-30.0)  sec


 


Chloride    ()  mmol/L


 


Carbon Dioxide    (22-30)  mmol/L


 


Glucose    (74-99)  mg/dL


 


Hemoglobin A1c    (<=6.0)  %

## 2025-03-06 LAB
ANION GAP SERPL CALC-SCNC: 6 MMOL/L
BUN SERPL-SCNC: 17 MG/DL (ref 9–20)
CALCIUM SPEC-MCNC: 8.5 MG/DL (ref 8.4–10.2)
CHLORIDE SERPL-SCNC: 92 MMOL/L (ref 98–107)
CO2 SERPL-SCNC: 40 MMOL/L (ref 22–30)
ERYTHROCYTE [DISTWIDTH] IN BLOOD BY AUTOMATED COUNT: 4.44 M/UL (ref 4.3–5.9)
ERYTHROCYTE [DISTWIDTH] IN BLOOD: 15.7 % (ref 11.5–15.5)
GLUCOSE SERPL-MCNC: 119 MG/DL (ref 74–99)
HCT VFR BLD AUTO: 44.3 % (ref 39–53)
HGB BLD-MCNC: 13.2 GM/DL (ref 13–17.5)
MAGNESIUM SPEC-SCNC: 2 MG/DL (ref 1.6–2.3)
MCH RBC QN AUTO: 29.7 PG (ref 25–35)
MCHC RBC AUTO-ENTMCNC: 29.8 G/DL (ref 31–37)
MCV RBC AUTO: 99.8 FL (ref 80–100)
PLATELET # BLD AUTO: 147 K/UL (ref 150–450)
POTASSIUM SERPL-SCNC: 3.9 MMOL/L (ref 3.5–5.1)
SODIUM SERPL-SCNC: 138 MMOL/L (ref 137–145)
WBC # BLD AUTO: 13.1 K/UL (ref 3.8–10.6)

## 2025-03-06 RX ADMIN — SPIRONOLACTONE SCH MG: 25 TABLET, FILM COATED ORAL at 08:14

## 2025-03-06 RX ADMIN — HEPARIN SODIUM SCH UNIT: 5000 INJECTION, SOLUTION INTRAVENOUS; SUBCUTANEOUS at 15:59

## 2025-03-06 RX ADMIN — LOSARTAN POTASSIUM STA MG: 25 TABLET, FILM COATED ORAL at 12:41

## 2025-03-06 NOTE — P.PN
Subjective


Progress Note Date: 03/06/25





64-year-old male with past medical history of hypertension presents with 

complaints of abdominal pain and shortness of breath.  Cardiology consulted for 

new onset CHF.  States that shortness of breath was getting worse over the last 

2 weeks and during this time also noticed swelling in the bilateral lower 

extremities.  Admits to orthopnea during this time, states he is only able to 

sleep in a recliner.  Reports he previously was on lisinopril and 

hydrochlorothiazide up until October of last year when he had an insurance 

problem and was no longer able to receive the medication.  Admits to being a smo

ker 1 pack a day 20 years ago.  Most recent echo in 2018 showed EF 50 to 55% 

with left atrial dilation.  Past surgical and family history unremarkable.  No 

previous cardiac procedures.





Vitals: Heart rate 94, respiratory rate 18, /83, O2 saturation 96% on 3 L


Labs: Significant for WBC 14.9, hemoglobin 14, , troponin peaked at 

0.068, BNP 2780, cholesterol 180, , and HDL 44


EKG: Sinus tachycardia


CXR: Cardiomegaly plus pulmonary vascular congestion





3/4


Patient seen and examined in the cardiac stepdown unit.  No significant events. 

Yesterday, patient underwent echo which showed EF 55 to 60% with no obvious wall

motion abnormalities.  Overnight patient had bursts of PVT's lasting less than 

30 seconds.  TSH and magnesium ordered yesterday were within normal limits, A1c 

was 7.9.  Blood pressures 122/49, heart rate 75, pulse ox 91% on 3 L.  Repeat 

blood work reveals WBC 13.9, hemoglobin 13.4, sodium 139, potassium 4.1, 

chloride 93, bicarb 39, glucose 137, and TSH 1.19.  Patient continues to have 

good urine output, documented that he is put out at least 1 L but after speaking

with nursing staff they report the patient "does not aim well "and they believe 

his urine output is greater than what is actually recorded.





3/5


Patient seen and examined in the cardiac stepdown unit.  No significant 

overnight events.  Patient had a urine output of 2 L in the last 24 hours, medardo hassan continue to mention that he has trouble aiming and believe the urine 

output is greater than is what is actually being recorded.  Heart rate 62, 

respiratory rate 18, blood pressure 139/77, O2 saturation 98% on 3 L.  Repeat 

blood work reveals WBC 13.2, hemoglobin 13.3, sodium 138, bicarb 41.  Patient 

continues to have lower extremity swelling with no marked improvement from 

previous days.





3/6/2025


Patient still continues to be very lethargic and short of breath.  We did try to

give him CPAP last night however he did not use it much.  Noticed to be 

hypertensive today, kidney function is stable, significantly hypercapnic.





PHYSICAL EXAM


Vital signs reviewed.


General: non toxic, no distress, morbidly obese


HEENT: Head is normocephalic. Pupils are equal, round. Sclerae anicteric. Mucous

membranes of the mouth are moist.  No JVD. No carotid bruit.


Cardiovascular: S1S2 reg, no murmur, 1+ pitting edema bilaterally up to the 

knee, worse in left leg


Lungs: Decreased air entry bilaterally, no rhonchi, no rales, no accessory 

muscle use, mild wheezing auscultated bilaterally


Abdominal: soft,  nontender to palpation, no guarding


NEUROLOGIC EXAMINATION: Patient is awake, alert and oriented x3. 





ASSESSMENT


-Acute CHF exacerbation


-NSTEMI type II due to demand supply mismatch


-Elevated troponin


-Hypercapnia


-Undiagnosed JUAREZ


-Morbid obesity


-Hypertension


-Type II DM





PLAN


-Continue Lipitor 40 daily and aspirin 81 mg daily


-Continue Coreg 6.25 mg twice daily, losartan 50 daily.  Discontinue IV heparin 

drip


-Continue Lasix 40 mg IV 3 times daily. 


-Diamox 250 mg 2 doses


-Aldactone to 25 mg daily


-Ordered CPAP for likely JUAREZ, also in the setting of elevated bicarb


-Strict I's and O's, daily weights, fluid restrict to 2 L


-Continue Farxiga 10 mg





Discharge disposition, social work consult


Once optimized from heart failure standpoint he will need ischemic evaluation on

outpatient basis.








Objective





- Vital Signs


Vital signs: 


                                   Vital Signs











Temp  98.2 F   03/06/25 11:25


 


Pulse  77   03/06/25 11:25


 


Resp  17   03/06/25 11:25


 


BP  175/76   03/06/25 11:25


 


Pulse Ox  97   03/06/25 11:25


 


FiO2  40   03/05/25 17:10








                                 Intake & Output











 03/05/25 03/06/25 03/06/25





 18:59 06:59 18:59


 


Intake Total 354 250 240


 


Balance 354 250 240


 


Weight  176.6 kg 


 


Intake:   


 


  Intake, IV Titration  250 





  Amount   


 


    Heparin Sod,Pork in 0.45%  250 





    NaCl 25,000 unit In 0.45   





    % NaCl 1 250ml.bag @ 5.   





    5279 UNITS/KG/HR 10 mls/   





    hr IV .Q24H [facility] Rx#:   





    [id number]   


 


  Oral 354  240


 


Other:   


 


  Voiding Method Toilet Toilet Toilet


 


  # Voids 1 2 














- Labs


CBC & Chem 7: 


                                 03/06/25 07:52





                                 03/06/25 07:52


Labs: 


                  Abnormal Lab Results - Last 24 Hours (Table)











  03/05/25 03/05/25 03/06/25 Range/Units





  16:09 16:54 07:52 


 


WBC    13.1 H  (3.8-10.6)  k/uL


 


MCHC    29.8 L  (31.0-37.0)  g/dL


 


RDW    15.7 H  (11.5-15.5)  %


 


Plt Count    147 L  (150-450)  k/uL


 


APTT     (22.0-30.0)  sec


 


ABG pH   7.34 L   (7.35-7.45)  


 


ABG pCO2   76 H*   (35-45)  mmHg


 


ABG pO2   78 L   ()  mmHg


 


ABG HCO3   41 H*   (21-25)  mmol/L


 


ABG Total CO2   43 H   (19-24)  mmol/L


 


VBG pH  7.30 L    (7.31-7.41)  


 


VBG pCO2  83 H*    (37-51)  mmHg


 


VBG HCO3  41 H    (24-28)  mmol/L


 


Chloride     ()  mmol/L


 


Carbon Dioxide     (22-30)  mmol/L


 


Glucose     (74-99)  mg/dL














  03/06/25 03/06/25 Range/Units





  07:52 07:52 


 


WBC    (3.8-10.6)  k/uL


 


MCHC    (31.0-37.0)  g/dL


 


RDW    (11.5-15.5)  %


 


Plt Count    (150-450)  k/uL


 


APTT   48.4 H  (22.0-30.0)  sec


 


ABG pH    (7.35-7.45)  


 


ABG pCO2    (35-45)  mmHg


 


ABG pO2    ()  mmHg


 


ABG HCO3    (21-25)  mmol/L


 


ABG Total CO2    (19-24)  mmol/L


 


VBG pH    (7.31-7.41)  


 


VBG pCO2    (37-51)  mmHg


 


VBG HCO3    (24-28)  mmol/L


 


Chloride  92 L   ()  mmol/L


 


Carbon Dioxide  40 H   (22-30)  mmol/L


 


Glucose  119 H   (74-99)  mg/dL

## 2025-03-06 NOTE — P.PN
Subjective


Progress Note Date: 03/06/25


64-year-old male who presents to the emergency department for abdominal pain and

shortness of breath.  States that he has been dealing with shortness of breath 

for the last 2 weeks.  He has also started to notice swelling in his lower 

extremities and abdomen.  Today he felt a pain in his left upper quadrant that 

occurred about 2 hours prior to arrival.  This has since persisted, prompting 

him to come here for evaluation.  Denies any nausea or vomiting.  Denies any 

changes in bowel or bladder habits.  Denies any history of respiratory or 

cardiac problems.


Patient hypoxic on arrival with an oxygen of 87%.  This improved with 2 L via 

nasal cannula.  


Lab work demonstrates leukocytosis with a white blood cell count of 17.5.  D-

dimer negative.  Lactic acid elevated at 2.3.  BNP elevated at 2780 and troponin

elevated at 0.057.  Patient denies a history of CHF.  


Chest x-ray demonstrates cardiomegaly with pulmonary vascular congestion. 


COVID, influenza, and RSV testing negative.  


Urinalysis negative for signs of infection.  


Given his abdominal pain ER attempted to get a CT scan on the patient.  However,

he could not comfortably lay flat for the testing. 





3/3/2025


Patient is seen in follow-up today with cardiology following.  Patient is being 

started on IV Lasix and was given a dose of Diamox.  2D echo ordered and pending

and patient denies any previous history of heart failure.  Patient reports 

significant swelling although is also morbidly obese with significantly large 

body habitus.  Patient currently living in a residential house and is noncompliant 

with any medication and outpatient follow-up.  Patient is short of breath and 

continues to be short of breath with conversation and minimal exertion.  Patient

will need outpatient evaluation by pulmonary for sleep apnea





3/4/2025


Patient is seen in follow-up today continues on IV Lasix with  cardiology 

following.  Lasix is being increased and adjustments to medications including 

adding Farxiga.  Patient and family inquiring when patient can potentially go 

home.  Patient is currently maintained on oxygen and will likely need oxygen on 

discharge as patient desats quickly into the high 70s and low 80s.  Encouraged 

increase activity as tolerated





3/5/2025


Patient is seen in follow-up today continues on IV Lasix with cardiology fo

llowing continues with significant volume overload and 1-2+ pitting edema of 

lower extremities.  Patient is extremely lethargic today maintained on 3 L of 

oxygen although is found often per nursing staff to have his oxygen off and 

desats quickly into the 70s.  Patient will most likely require oxygen on 

discharge as there is high concerns with obesity hypoventilation syndrome.  Per 

nursing staff patient is mostly sleeping throughout the day and will obtain 

ABGs.  Carbon dioxide at 41 today otherwise kidney function stable with a sodium

of 138 potassium 4.0, BUN 13 and creatinine 0.83.  Patient currently resides at 

a residential house and unsure of discharge planning.  Recommend PT/OT therapy eval

uation and case management/social work consult.





03/06/2025


Is eval today sitting up in the chair.  He reports improvement in his shortness 

of breath he seems to be more awake and alert as compared to yesterday.  He was 

placed on a BiPAP with settings of 12/5 at 40% FiO2 yesterday secondary to ABGs 

revealing pH level of 7.34, pCO2 of 76 pO2 of 78 HCO3 of 41 and a total CO2 

level of 43.  Today's electrolytes and renal function are stable.  His white 

blood cell count is 13.1.  He continues to have significant lower extremity 

edema.  He has been continued on IV Lasix and diuresing well as he keeps having 

to get up to the restroom he has not been able to wear the BiPAP much today.  

Cardiogram comes back revealing an EF of 55 to 60% with severe right ventricle 

dilation with moderate pulmonary hypertension.  Mild mitral and tricuspid 

regurgitation and mild pulmonary hypertension.





Review of systems:


Constitutional: reports of fatigue, no fever, or chills


Cardiovascular: No reports of chest pain or palpitations


Respiratory: reports of ongoing shortness of breath 


GI: No reports of nausea, vomiting, or diarrhea


: No reports of dysuria or retention


Neurovascular: reports of generalized weakness 


All medications have been reviewed





Physical exam:





Gen: This is a 64-year-old male who is currently lethargic although arousable, 

fatigues easily, well-developed, morbidly obese, ill-appearing, appears older 

than stated age, unkempt and disheveled


HEENT: Head is atraumatic, normocephalic. Pupils equal, round. Sclerae is 

anicteric. 


NECK: Supple. No JVD. No lymphadenopathy. No thyromegaly. 


LUNGS: Diminished breath sounds bilaterally with some faint crackles noted at 

the bases.  No intercostal retractions.


HEART: S1, S2 are muffled


ABDOMEN: Soft.  Morbidly obese bowel sounds are present. No masses.  No 

tenderness.


EXTREMITIES: Bilateral lower extremity edema noted 1-2+ pitting.  No calf 

tenderness.  Tether noted


NEUROLOGICAL: Patient is lethargic although arousable, alert and oriented x3. 

Cranial nerves 2 through 12 are grossly intact.  Diffusely weak








Assessment:





-New onset CHF; acute exacerbation, pulmonary vascular congestion noted on 

imaging and BNP was elevated at 2780, preserved EF of 55 to 60%


-Acute hypoxic respiratory failure secondary to above, maintained on 3 L


-Leukocytosis/possible UTI; lactic acid is elevated at 2.3; no signs of 

infection; chest x-ray is negative for any acute process


-Elevated troponin; type II secondary to supply demand mismatch 


-Hypertension; lisinopril 20 mg daily


-Morbid obesity with a BMI of 55.6; possible obesity hypoventilation syndrome, 

will need outpatient testing


-New diabetes mellitus type 2 with hemoglobin A1C of 7.9. 





GI prophylaxis


DVT prophylaxis; SCDs/subcu heparin


CODE STATUS; full code





Plan:


Patient is continued on IV Lasix with cardiology following; Recommend strict 

intake and output monitoring 


Continue supportive supplemental oxygen at 3 L and does not wear oxygen 

outpatient.  Likely sleep apnea although undiagnosed concerns for obesity 

hypoventilation syndrome


Patient sleeping throughout most of the day and more lethargic somewhat 

obtunded, ABGs obtained with a pH of 7.3, pCO2 elevated at 76, bicarb elevated 

at 41 and will attempt BiPAP


Pulmonology has been consulted 


Patient has been started on oral diamox daily 


Recommend PT/OT therapy evaluation and encouraged increase activity as tolerated


Patient is sitting up in the chair mostly sleeping


Case management/social work to evaluate as patient currently lives at a residential 

house and unsure of discharge planning at this time.


Continue to monitor electrolytes and renal function 





The impression and plan of care has been dictated by Radha Weeks Nurse 

Practitioner as directed.





Dr. Bobo MD


I have performed a history and examination and MDM of this patient, discussed 

the same with the dictator, and  agree with the dictator's assessment and plan 

as written ,documented as a scribe. Based on total visit time,  I have performed

more than 50% of the visit.








Objective





- Vital Signs


Vital signs: 


                                   Vital Signs











Temp  98.2 F   03/06/25 11:25


 


Pulse  77   03/06/25 11:25


 


Resp  17   03/06/25 11:25


 


BP  175/76   03/06/25 11:25


 


Pulse Ox  97   03/06/25 11:25


 


FiO2  40   03/05/25 17:10








                                 Intake & Output











 03/05/25 03/06/25 03/06/25





 18:59 06:59 18:59


 


Intake Total 354 250 660.933


 


Balance 354 250 660.933


 


Weight  176.6 kg 


 


Intake:   


 


  Intake, IV Titration  250 180.933





  Amount   


 


    Heparin Sod,Pork in 0.45%  250 180.933





    NaCl 25,000 unit In 0.45   





    % NaCl 1 250ml.bag @ 5.   





    5279 UNITS/KG/HR 10 mls/   





    hr [facility]   





    [id number]   


 


  Oral 354  480


 


Other:   


 


  Voiding Method Toilet Toilet Toilet


 


  # Voids 1 2 














- Labs


CBC & Chem 7: 


                                 03/06/25 07:52





                                 03/06/25 07:52


Labs: 


                  Abnormal Lab Results - Last 24 Hours (Table)











  03/05/25 03/05/25 03/06/25 Range/Units





  16:09 16:54 07:52 


 


WBC    13.1 H  (3.8-10.6)  k/uL


 


MCHC    29.8 L  (31.0-37.0)  g/dL


 


RDW    15.7 H  (11.5-15.5)  %


 


Plt Count    147 L  (150-450)  k/uL


 


APTT     (22.0-30.0)  sec


 


ABG pH   7.34 L   (7.35-7.45)  


 


ABG pCO2   76 H*   (35-45)  mmHg


 


ABG pO2   78 L   ()  mmHg


 


ABG HCO3   41 H*   (21-25)  mmol/L


 


ABG Total CO2   43 H   (19-24)  mmol/L


 


VBG pH  7.30 L    (7.31-7.41)  


 


VBG pCO2  83 H*    (37-51)  mmHg


 


VBG HCO3  41 H    (24-28)  mmol/L


 


Chloride     ()  mmol/L


 


Carbon Dioxide     (22-30)  mmol/L


 


Glucose     (74-99)  mg/dL














  03/06/25 03/06/25 Range/Units





  07:52 07:52 


 


WBC    (3.8-10.6)  k/uL


 


MCHC    (31.0-37.0)  g/dL


 


RDW    (11.5-15.5)  %


 


Plt Count    (150-450)  k/uL


 


APTT   48.4 H  (22.0-30.0)  sec


 


ABG pH    (7.35-7.45)  


 


ABG pCO2    (35-45)  mmHg


 


ABG pO2    ()  mmHg


 


ABG HCO3    (21-25)  mmol/L


 


ABG Total CO2    (19-24)  mmol/L


 


VBG pH    (7.31-7.41)  


 


VBG pCO2    (37-51)  mmHg


 


VBG HCO3    (24-28)  mmol/L


 


Chloride  92 L   ()  mmol/L


 


Carbon Dioxide  40 H   (22-30)  mmol/L


 


Glucose  119 H   (74-99)  mg/dL














Assessment and Plan


Time with Patient: Less than 30

## 2025-03-06 NOTE — P.PN
Subjective


Progress Note Date: 03/05/25











64-year-old male who presents to the emergency department for abdominal pain and

shortness of breath.  States that he has been dealing with shortness of breath 

for the last 2 weeks.  He has also started to notice swelling in his lower 

extremities and abdomen.  Today he felt a pain in his left upper quadrant that 

occurred about 2 hours prior to arrival.  This has since persisted, prompting 

him to come here for evaluation.  Denies any nausea or vomiting.  Denies any 

changes in bowel or bladder habits.  Denies any history of respiratory or 

cardiac problems.


Patient hypoxic on arrival with an oxygen of 87%.  This improved with 2 L via 

nasal cannula.  


Lab work demonstrates leukocytosis with a white blood cell count of 17.5.  D-

dimer negative.  Lactic acid elevated at 2.3.  BNP elevated at 2780 and troponin

elevated at 0.057.  Patient denies a history of CHF.  


Chest x-ray demonstrates cardiomegaly with pulmonary vascular congestion. 


COVID, influenza, and RSV testing negative.  


Urinalysis negative for signs of infection.  


Given his abdominal pain ER attempted to get a CT scan on the patient.  However,

he could not comfortably lay flat for the testing. 





3/3/2025


Patient is seen in follow-up today with cardiology following.  Patient is being 

started on IV Lasix and was given a dose of Diamox.  2D echo ordered and pending

and patient denies any previous history of heart failure.  Patient reports 

significant swelling although is also morbidly obese with significantly large 

body habitus.  Patient currently living in a [facility] house and is noncompliant 

with any medication and outpatient follow-up.  Patient is short of breath and 

continues to be short of breath with conversation and minimal exertion.  Patient

will need outpatient evaluation by pulmonary for sleep apnea





3/4/2025


Patient is seen in follow-up today continues on IV Lasix with  cardiology 

following.  Lasix is being increased and adjustments to medications including 

adding Farxiga.  Patient and family inquiring when patient can potentially go 

home.  Patient is currently maintained on oxygen and will likely need oxygen on 

discharge as patient desats quickly into the high 70s and low 80s.  Encouraged 

increase activity as tolerated





3/5/2025


Patient is seen in follow-up today continues on IV Lasix with cardiology 

following continues with significant volume overload and 1-2+ pitting edema of 

lower extremities.  Patient is extremely lethargic today maintained on 3 L of 

oxygen although is found often per nursing staff to have his oxygen off and 

desats quickly into the 70s.  Patient will most likely require oxygen on 

discharge as there is high concerns with obesity hypoventilation syndrome.  Per 

nursing staff patient is mostly sleeping throughout the day and will obtain 

ABGs.  Carbon dioxide at 41 today otherwise kidney function stable with a sodium

of 138 potassium 4.0, BUN 13 and creatinine 0.83.  Patient currently resides at 

a [facility] house and unsure of discharge planning.  Recommend PT/OT therapy e

valuation and case management/social work consult.





Review of systems:


Constitutional: reports of fatigue, no fever, or chills


Cardiovascular: No reports of chest pain or palpitations


Respiratory: reports of ongoing shortness of breath 


GI: No reports of nausea, vomiting, or diarrhea


: No reports of dysuria or retention


Neurovascular: reports of generalized weakness 


All medications have been reviewed





Physical exam:





Gen: This is a 64-year-old male who is currently lethargic although arousable, 

fatigues easily, well-developed, morbidly obese, ill-appearing, appears older 

than stated age, unkempt and disheveled


HEENT: Head is atraumatic, normocephalic. Pupils equal, round. Sclerae is anict

tani. 


NECK: Supple. No JVD. No lymphadenopathy. No thyromegaly. 


LUNGS: Diminished breath sounds bilaterally with some faint crackles noted at 

the bases.  No intercostal retractions.


HEART: S1, S2 are muffled


ABDOMEN: Soft.  Morbidly obese bowel sounds are present. No masses.  No 

tenderness.


EXTREMITIES: Bilateral lower extremity edema noted 1-2+ pitting.  No calf 

tenderness.  Tether noted


NEUROLOGICAL: Patient is lethargic although arousable, alert and oriented x3. 

Cranial nerves 2 through 12 are grossly intact.  Diffusely weak








Assessment:





-New onset CHF; acute exacerbation, pulmonary vascular congestion noted on 

imaging and BNP was elevated at 2780, preserved EF of 55 to 60%


-Acute hypoxic respiratory failure secondary to above, maintained on 3 L


-Leukocytosis/possible UTI; lactic acid is elevated at 2.3; no signs of 

infection; chest x-ray is negative for any acute process


-Elevated troponin; type II secondary to supply demand mismatch 


-Hypertension; lisinopril 20 mg daily


-Morbid obesity with a BMI of 55.6; possible obesity hypoventilation syndrome, 

will need outpatient testing








GI prophylaxis


DVT prophylaxis; SCDs/subcu heparin


CODE STATUS; full code





Plan:


Patient is continued on IV Lasix with cardiology following


Continue supportive supplemental oxygen at 3 L and does not wear oxygen 

outpatient.  Likely sleep apnea although undiagnosed concerns for obesity 

hypoventilation syndrome


Patient sleeping throughout most of the day and more lethargic somewhat 

obtunded, ABGs obtained with a pH of 7.3, pCO2 elevated at 76, bicarb elevated 

at 41 and will attempt BiPAP


Recommend PT/OT therapy evaluation and encouraged increase activity as tolerated


Patient is sitting up in the chair mostly sleeping


Case management/social work to evaluate as patient currently lives at a [facility] 

house and unsure of discharge planning at this time.








The impression and plan of care has been dictated by Amanda Eastman, Nurse 

Practitioner as directed.





Dr. Bobo MD


I have performed a history and examination and MDM of this patient, discussed 

the same with the dictator, and  agree with the dictator's assessment and plan 

as written ,documented as a scribe. Based on total visit time,  I have performed

more than 50% of the visit.











Objective





- Vital Signs


Vital signs: 


                                   Vital Signs











Temp  98.0 F   03/05/25 04:00


 


Pulse  69   03/05/25 04:00


 


Resp  18   03/05/25 04:00


 


BP  141/73   03/05/25 04:00


 


Pulse Ox  95   03/05/25 08:19


 


FiO2      








                                 Intake & Output











 03/04/25 03/05/25 03/05/25





 18:59 06:59 18:59


 


Intake Total 734.31 250 118


 


Balance 734.31 250 118


 


Weight  177.6 kg 


 


Intake:   


 


  Intake, IV Titration 194.31 250 





  Amount   


 


    Heparin Sod,Pork in 0.45% 194.31 250 





    NaCl 25,000 unit In 0.45   





    % NaCl 1 250ml.bag @ 5.   





    5279 UNITS/KG/HR 10 mls/   





    hr IV .Q24H [facility] Rx#:   





    [id number]   


 


  Oral 540  118


 


Other:   


 


  Voiding Method Toilet Toilet 


 


  # Voids 2 1 














- Labs


CBC & Chem 7: 


                                 03/05/25 07:58





                                 03/05/25 07:58


Labs: 


                  Abnormal Lab Results - Last 24 Hours (Table)











  03/04/25 03/05/25 03/05/25 Range/Units





  12:20 07:58 07:58 


 


WBC    13.2 H  (3.8-10.6)  k/uL


 


MCHC    30.1 L  (31.0-37.0)  g/dL


 


RDW    16.2 H  (11.5-15.5)  %


 


Neutrophils #    11.2 H  (1.3-7.7)  k/uL


 


APTT  52.2 H  56.3 H   (22.0-30.0)  sec


 


Chloride     ()  mmol/L


 


Carbon Dioxide     (22-30)  mmol/L


 


Glucose     (74-99)  mg/dL














  03/05/25 Range/Units





  07:58 


 


WBC   (3.8-10.6)  k/uL


 


MCHC   (31.0-37.0)  g/dL


 


RDW   (11.5-15.5)  %


 


Neutrophils #   (1.3-7.7)  k/uL


 


APTT   (22.0-30.0)  sec


 


Chloride  91 L  ()  mmol/L


 


Carbon Dioxide  41 H*  (22-30)  mmol/L


 


Glucose  126 H  (74-99)  mg/dL

## 2025-03-07 LAB
ANION GAP SERPL CALC-SCNC: 7 MMOL/L
BASOPHILS # BLD AUTO: 0.1 K/UL (ref 0–0.2)
BASOPHILS NFR BLD AUTO: 0 %
BUN SERPL-SCNC: 23 MG/DL (ref 9–20)
CALCIUM SPEC-MCNC: 9 MG/DL (ref 8.4–10.2)
CHLORIDE SERPL-SCNC: 90 MMOL/L (ref 98–107)
CO2 SERPL-SCNC: 42 MMOL/L (ref 22–30)
EOSINOPHIL # BLD AUTO: 0.2 K/UL (ref 0–0.7)
EOSINOPHIL NFR BLD AUTO: 1 %
ERYTHROCYTE [DISTWIDTH] IN BLOOD BY AUTOMATED COUNT: 4.62 M/UL (ref 4.3–5.9)
ERYTHROCYTE [DISTWIDTH] IN BLOOD: 15.7 % (ref 11.5–15.5)
GLUCOSE SERPL-MCNC: 121 MG/DL (ref 74–99)
HCT VFR BLD AUTO: 45.7 % (ref 39–53)
HGB BLD-MCNC: 13.8 GM/DL (ref 13–17.5)
LYMPHOCYTES # SPEC AUTO: 0.8 K/UL (ref 1–4.8)
LYMPHOCYTES NFR SPEC AUTO: 6 %
MCH RBC QN AUTO: 29.8 PG (ref 25–35)
MCHC RBC AUTO-ENTMCNC: 30.1 G/DL (ref 31–37)
MCV RBC AUTO: 99 FL (ref 80–100)
MONOCYTES # BLD AUTO: 0.8 K/UL (ref 0–1)
MONOCYTES NFR BLD AUTO: 6 %
NEUTROPHILS # BLD AUTO: 11.6 K/UL (ref 1.3–7.7)
NEUTROPHILS NFR BLD AUTO: 85 %
PLATELET # BLD AUTO: 167 K/UL (ref 150–450)
POTASSIUM SERPL-SCNC: 3.7 MMOL/L (ref 3.5–5.1)
SODIUM SERPL-SCNC: 139 MMOL/L (ref 137–145)
WBC # BLD AUTO: 13.6 K/UL (ref 3.8–10.6)

## 2025-03-07 RX ADMIN — LOSARTAN POTASSIUM SCH MG: 50 TABLET, FILM COATED ORAL at 08:21

## 2025-03-07 RX ADMIN — LOSARTAN POTASSIUM STA MG: 50 TABLET, FILM COATED ORAL at 20:31

## 2025-03-07 RX ADMIN — BUMETANIDE SCH MG: 1 TABLET ORAL at 20:31

## 2025-03-07 RX ADMIN — ISODIUM CHLORIDE SCH: 0.03 SOLUTION RESPIRATORY (INHALATION) at 11:32

## 2025-03-07 RX ADMIN — BUDESONIDE AND FORMOTEROL FUMARATE DIHYDRATE SCH PUFF: 160; 4.5 AEROSOL RESPIRATORY (INHALATION) at 20:20

## 2025-03-07 NOTE — P.PN
Subjective


Progress Note Date: 03/07/25





64-year-old male with past medical history of hypertension presents with 

complaints of abdominal pain and shortness of breath.  Cardiology consulted for 

new onset CHF.  States that shortness of breath was getting worse over the last 

2 weeks and during this time also noticed swelling in the bilateral lower 

extremities.  Admits to orthopnea during this time, states he is only able to 

sleep in a recliner.  Reports he previously was on lisinopril and 

hydrochlorothiazide up until October of last year when he had an insurance 

problem and was no longer able to receive the medication.  Admits to being a smo

ker 1 pack a day 20 years ago.  Most recent echo in 2018 showed EF 50 to 55% 

with left atrial dilation.  Past surgical and family history unremarkable.  No 

previous cardiac procedures.





Vitals: Heart rate 94, respiratory rate 18, /83, O2 saturation 96% on 3 L


Labs: Significant for WBC 14.9, hemoglobin 14, , troponin peaked at 

0.068, BNP 2780, cholesterol 180, , and HDL 44


EKG: Sinus tachycardia


CXR: Cardiomegaly plus pulmonary vascular congestion





3/4


Patient seen and examined in the cardiac stepdown unit.  No significant events. 

Yesterday, patient underwent echo which showed EF 55 to 60% with no obvious wall

motion abnormalities.  Overnight patient had bursts of PVT's lasting less than 

30 seconds.  TSH and magnesium ordered yesterday were within normal limits, A1c 

was 7.9.  Blood pressures 122/49, heart rate 75, pulse ox 91% on 3 L.  Repeat 

blood work reveals WBC 13.9, hemoglobin 13.4, sodium 139, potassium 4.1, 

chloride 93, bicarb 39, glucose 137, and TSH 1.19.  Patient continues to have 

good urine output, documented that he is put out at least 1 L but after speaking

with nursing staff they report the patient "does not aim well "and they believe 

his urine output is greater than what is actually recorded.





3/5


Patient seen and examined in the cardiac stepdown unit.  No significant 

overnight events.  Patient had a urine output of 2 L in the last 24 hours, medardo hassan continue to mention that he has trouble aiming and believe the urine 

output is greater than is what is actually being recorded.  Heart rate 62, 

respiratory rate 18, blood pressure 139/77, O2 saturation 98% on 3 L.  Repeat 

blood work reveals WBC 13.2, hemoglobin 13.3, sodium 138, bicarb 41.  Patient 

continues to have lower extremity swelling with no marked improvement from 

previous days.





3/6/2025


Patient still continues to be very lethargic and short of breath.  We did try to

give him CPAP last night however he did not use it much.  Noticed to be 

hypertensive today, kidney function is stable, significantly hypercapnic.





3/7/2025


Patient is very lethargic still short of breath.  BP still uncontrolled with 

some readings at 170/84, repeat 143/63, heart rate 67 bpm.





PHYSICAL EXAM


Vital signs reviewed.


General: non toxic, no distress, morbidly obese


HEENT: Head is normocephalic. Pupils are equal, round. Sclerae anicteric. Mucous

membranes of the mouth are moist.  No JVD. No carotid bruit.


Cardiovascular: S1S2 reg, no murmur, 1+ pitting edema bilaterally up to the 

knee, worse in left leg


Lungs: Decreased air entry bilaterally, no rhonchi, no rales, no accessory 

muscle use, mild wheezing auscultated bilaterally


Abdominal: soft,  nontender to palpation, no guarding


NEUROLOGIC EXAMINATION: Patient is awake, alert and oriented x3. 





ASSESSMENT


-Acute HFpEF exacerbation


-Type II NSTEMI due to CHF exacerbation


-Acute hypoxic and hypercapnic respiratory failure


Newly diagnosed type 2 diabetes, HbA1c 7.9,


Dyslipidemia, LDL 92,


-Hypercapnia


-Undiagnosed JUAREZ with pickwickian syndrome


-Morbid obesity


-Hypertension





PLAN


Increase losartan to 100 mg daily


For diuretics discontinue IV diuretic.  Start Bumex 1 mg p.o. twice daily, 

metolazone 2.5 mg daily, Farxiga 10 mg, Aldactone 25 mg


Continue aspirin, Lipitor, Coreg 6.25 mL twice daily


Recommend JUAREZ management.  Home O2 evaluation prior to discharge





At this time patient is cleared from cardiovascular standpoint.  Cardiology team

will sign off.  Please reconsult us in case of any question.


Discharge disposition, social work consult


Patient will need ischemic evaluation and pulmonary hypertension with a left and

right heart catheterization on outpatient basis.





Objective





- Vital Signs


Vital signs: 


                                   Vital Signs











Temp  98.8 F   03/07/25 19:32


 


Pulse  68   03/07/25 19:32


 


Resp  24   03/07/25 19:32


 


BP  143/68   03/07/25 19:32


 


Pulse Ox  94 L  03/07/25 19:32


 


FiO2  40   03/05/25 17:10








                                 Intake & Output











 03/07/25 03/07/25 03/08/25





 06:59 18:59 06:59


 


Intake Total  1374 


 


Output Total 900 1000 


 


Balance -900 374 


 


Weight 172.3 kg  


 


Intake:   


 


  Oral  1374 


 


Output:   


 


  Urine 900 1000 


 


Other:   


 


  Voiding Method Toilet Toilet 


 


  # Bowel Movements  1 














- Labs


CBC & Chem 7: 


                                 03/07/25 06:59





                                 03/07/25 06:59


Labs: 


                  Abnormal Lab Results - Last 24 Hours (Table)











  03/07/25 03/07/25 Range/Units





  06:59 06:59 


 


WBC  13.6 H   (3.8-10.6)  k/uL


 


MCHC  30.1 L   (31.0-37.0)  g/dL


 


RDW  15.7 H   (11.5-15.5)  %


 


Neutrophils #  11.6 H   (1.3-7.7)  k/uL


 


Lymphocytes #  0.8 L   (1.0-4.8)  k/uL


 


Chloride   90 L  ()  mmol/L


 


Carbon Dioxide   42 H*  (22-30)  mmol/L


 


BUN   23 H  (9-20)  mg/dL


 


Glucose   121 H  (74-99)  mg/dL

## 2025-03-08 LAB
ANION GAP SERPL CALC-SCNC: 7 MMOL/L
BASOPHILS # BLD AUTO: 0.1 K/UL (ref 0–0.2)
BASOPHILS NFR BLD AUTO: 1 %
BUN SERPL-SCNC: 25 MG/DL (ref 9–20)
CALCIUM SPEC-MCNC: 9.2 MG/DL (ref 8.4–10.2)
CHLORIDE SERPL-SCNC: 88 MMOL/L (ref 98–107)
CO2 SERPL-SCNC: 40 MMOL/L (ref 22–30)
EOSINOPHIL # BLD AUTO: 0.2 K/UL (ref 0–0.7)
EOSINOPHIL NFR BLD AUTO: 2 %
ERYTHROCYTE [DISTWIDTH] IN BLOOD BY AUTOMATED COUNT: 4.77 M/UL (ref 4.3–5.9)
ERYTHROCYTE [DISTWIDTH] IN BLOOD: 16 % (ref 11.5–15.5)
GLUCOSE SERPL-MCNC: 120 MG/DL (ref 74–99)
HCT VFR BLD AUTO: 46.4 % (ref 39–53)
HGB BLD-MCNC: 14.3 GM/DL (ref 13–17.5)
LYMPHOCYTES # SPEC AUTO: 0.8 K/UL (ref 1–4.8)
LYMPHOCYTES NFR SPEC AUTO: 8 %
MAGNESIUM SPEC-SCNC: 1.9 MG/DL (ref 1.6–2.3)
MCH RBC QN AUTO: 30.1 PG (ref 25–35)
MCHC RBC AUTO-ENTMCNC: 31 G/DL (ref 31–37)
MCV RBC AUTO: 97.1 FL (ref 80–100)
MONOCYTES # BLD AUTO: 0.6 K/UL (ref 0–1)
MONOCYTES NFR BLD AUTO: 6 %
NEUTROPHILS # BLD AUTO: 8.5 K/UL (ref 1.3–7.7)
NEUTROPHILS NFR BLD AUTO: 82 %
PLATELET # BLD AUTO: 158 K/UL (ref 150–450)
POTASSIUM SERPL-SCNC: 4.2 MMOL/L (ref 3.5–5.1)
SODIUM SERPL-SCNC: 135 MMOL/L (ref 137–145)
WBC # BLD AUTO: 10.3 K/UL (ref 3.8–10.6)

## 2025-03-08 PROCEDURE — 5A09357 ASSISTANCE WITH RESPIRATORY VENTILATION, LESS THAN 24 CONSECUTIVE HOURS, CONTINUOUS POSITIVE AIRWAY PRESSURE: ICD-10-PCS

## 2025-03-08 RX ADMIN — LOSARTAN POTASSIUM SCH MG: 50 TABLET, FILM COATED ORAL at 09:24

## 2025-03-08 NOTE — P.PN
Subjective


Progress Note Date: 03/07/25











64-year-old male who presents to the emergency department for abdominal pain and

shortness of breath.  States that he has been dealing with shortness of breath 

for the last 2 weeks.  He has also started to notice swelling in his lower 

extremities and abdomen.  Today he felt a pain in his left upper quadrant that 

occurred about 2 hours prior to arrival.  This has since persisted, prompting 

him to come here for evaluation.  Denies any nausea or vomiting.  Denies any 

changes in bowel or bladder habits.  Denies any history of respiratory or 

cardiac problems.


Patient hypoxic on arrival with an oxygen of 87%.  This improved with 2 L via 

nasal cannula.  


Lab work demonstrates leukocytosis with a white blood cell count of 17.5.  D-

dimer negative.  Lactic acid elevated at 2.3.  BNP elevated at 2780 and troponin

elevated at 0.057.  Patient denies a history of CHF.  


Chest x-ray demonstrates cardiomegaly with pulmonary vascular congestion. 


COVID, influenza, and RSV testing negative.  


Urinalysis negative for signs of infection.  


Given his abdominal pain ER attempted to get a CT scan on the patient.  However,

he could not comfortably lay flat for the testing. 





3/3/2025


Patient is seen in follow-up today with cardiology following.  Patient is being 

started on IV Lasix and was given a dose of Diamox.  2D echo ordered and pending

and patient denies any previous history of heart failure.  Patient reports 

significant swelling although is also morbidly obese with significantly large 

body habitus.  Patient currently living in a senior care house and is noncompliant 

with any medication and outpatient follow-up.  Patient is short of breath and 

continues to be short of breath with conversation and minimal exertion.  Patient

will need outpatient evaluation by pulmonary for sleep apnea





3/4/2025


Patient is seen in follow-up today continues on IV Lasix with  cardiology 

following.  Lasix is being increased and adjustments to medications including 

adding Farxiga.  Patient and family inquiring when patient can potentially go 

home.  Patient is currently maintained on oxygen and will likely need oxygen on 

discharge as patient desats quickly into the high 70s and low 80s.  Encouraged 

increase activity as tolerated





3/5/2025


Patient is seen in follow-up today continues on IV Lasix with cardiology 

following continues with significant volume overload and 1-2+ pitting edema of 

lower extremities.  Patient is extremely lethargic today maintained on 3 L of 

oxygen although is found often per nursing staff to have his oxygen off and 

desats quickly into the 70s.  Patient will most likely require oxygen on 

discharge as there is high concerns with obesity hypoventilation syndrome.  Per 

nursing staff patient is mostly sleeping throughout the day and will obtain 

ABGs.  Carbon dioxide at 41 today otherwise kidney function stable with a sodium

of 138 potassium 4.0, BUN 13 and creatinine 0.83.  Patient currently resides at 

a senior care house and unsure of discharge planning.  Recommend PT/OT therapy e

valuation and case management/social work consult.





03/06/2025


Is eval today sitting up in the chair.  He reports improvement in his shortness 

of breath he seems to be more awake and alert as compared to yesterday.  He was 

placed on a BiPAP with settings of 12/5 at 40% FiO2 yesterday secondary to ABGs 

revealing pH level of 7.34, pCO2 of 76 pO2 of 78 HCO3 of 41 and a total CO2 

level of 43.  Today's electrolytes and renal function are stable.  His white 

blood cell count is 13.1.  He continues to have significant lower extremity 

edema.  He has been continued on IV Lasix and diuresing well as he keeps having 

to get up to the restroom he has not been able to wear the BiPAP much today.  

Cardiogram comes back revealing an EF of 55 to 60% with severe right ventricle 

dilation with moderate pulmonary hypertension.  Mild mitral and tricuspid 

regurgitation and mild pulmonary hypertension.





3/7/2025


Patient is seen in follow-up today continues on IV Lasix.  Patient transition to

CPAP at night and tolerating and will continue nasal cannula.  Patient desats 

quickly and will likely require oxygen on discharge.  Patient continues with 

high-dose Lasix and diuresing and will follow-up on repeat labs.  Encouraged 

increase activity as tolerated.











Review of systems:


Constitutional: reports of fatigue, no fever, or chills


Cardiovascular: No reports of chest pain or palpitations


Respiratory: reports of ongoing shortness of breath 


GI: No reports of nausea, vomiting, or diarrhea


: No reports of dysuria or retention


Neurovascular: reports of generalized weakness 


All medications have been reviewed





Physical exam:





Gen: This is a 64-year-old male who is more awake today, fatigues easily, well-d

eveloped, morbidly obese, ill-appearing, appears older than stated age, unkempt 

and disheveled


HEENT: Head is atraumatic, normocephalic. Pupils equal, round. Sclerae is 

anicteric. 


NECK: Supple. No JVD. No lymphadenopathy. No thyromegaly. 


LUNGS: Diminished breath sounds bilaterally with some faint crackles noted at 

the bases.  No intercostal retractions.


HEART: S1, S2 are muffled


ABDOMEN: Soft.  Morbidly obese bowel sounds are present. No masses.  No 

tenderness.


EXTREMITIES: Bilateral lower extremity edema noted 1-2+ pitting.  No calf 

tenderness.  Tether noted


NEUROLOGICAL: Patient is lethargic although arousable, alert and oriented x3. 

Cranial nerves 2 through 12 are grossly intact.  Diffusely weak








Assessment:





-New onset CHF; acute exacerbation, pulmonary vascular congestion noted on 

imaging and BNP was elevated at 2780, preserved EF of 55 to 60%


-Acute hypoxic respiratory failure secondary to above, maintained on 3 L


-Leukocytosis/possible UTI; lactic acid is elevated at 2.3; no signs of 

infection; chest x-ray is negative for any acute process


-Elevated troponin; type II secondary to supply demand mismatch 


-Hypertension; lisinopril 20 mg daily


-Morbid obesity with a BMI of 55.6; possible obesity hypoventilation syndrome, 

will need outpatient testing


-New diabetes mellitus type 2 with hemoglobin A1C of 7.9. 





GI prophylaxis


DVT prophylaxis; SCDs/subcu heparin


CODE STATUS; full code





Plan:


Patient is continued on IV Lasix with cardiology following; Recommend strict 

intake and output monitoring 


Continue supportive supplemental oxygen at 3 L and does not wear oxygen out

patient.  Likely sleep apnea although undiagnosed concerns for obesity 

hypoventilation syndrome


Patient has been started on CPAP with pulmonary following will continue nasal 

cannula and wean FiO2 as tolerated.  Patient normally does not wear oxygen 

outpatient and will likely require oxygen on discharge


Patient has been started on oral diamox daily 


Recommend PT/OT therapy evaluation and encouraged increase activity as tolerated


Patient is sitting up in the chair mostly sleeping


Case management/social work to evaluate as patient currently lives at a senior care 

house and unsure of discharge planning at this time.


Continue to monitor electrolytes and renal function 





The impression and plan of care has been dictated by Amanda Eastman, Nurse 

Practitioner as directed.





Dr. Bobo MD


I have performed a history and examination and MDM of this patient, discussed 

the same with the dictator, and  agree with the dictator's assessment and plan 

as written ,documented as a scribe. Based on total visit time,  I have performed

more than 50% of the visit.











Objective





- Vital Signs


Vital signs: 


                                   Vital Signs











Temp  98.2 F   03/07/25 03:32


 


Pulse  67   03/07/25 03:32


 


Resp  28 H  03/07/25 03:32


 


BP  134/77   03/07/25 03:32


 


Pulse Ox  96   03/07/25 08:08


 


FiO2  40   03/05/25 17:10








                                 Intake & Output











 03/06/25 03/07/25 03/07/25





 18:59 06:59 18:59


 


Intake Total 900.933  


 


Output Total  900 


 


Balance 900.933 -900 


 


Weight  172.3 kg 


 


Intake:   


 


  Intake, IV Titration 180.933  





  Amount   


 


    Heparin Sod,Pork in 0.45% 180.933  





    NaCl 25,000 unit In 0.45   





    % NaCl 1 250ml.bag @ 5.   





    5279 UNITS/KG/HR 10 mls/   





    hr IV .Q24H THU Rx#:   





    [id number]   


 


  Oral 720  


 


Output:   


 


  Urine  900 


 


Other:   


 


  Voiding Method Toilet Toilet 


 


  # Voids 6  














- Labs


CBC & Chem 7: 


                                 03/08/25 07:04





                                 03/08/25 07:04


Labs: 


                  Abnormal Lab Results - Last 24 Hours (Table)











  03/07/25 03/07/25 Range/Units





  06:59 06:59 


 


WBC  13.6 H   (3.8-10.6)  k/uL


 


MCHC  30.1 L   (31.0-37.0)  g/dL


 


RDW  15.7 H   (11.5-15.5)  %


 


Neutrophils #  11.6 H   (1.3-7.7)  k/uL


 


Lymphocytes #  0.8 L   (1.0-4.8)  k/uL


 


Chloride   90 L  ()  mmol/L


 


Carbon Dioxide   42 H*  (22-30)  mmol/L


 


BUN   23 H  (9-20)  mg/dL


 


Glucose   121 H  (74-99)  mg/dL

## 2025-03-08 NOTE — P.PN
Subjective


Progress Note Date: 03/08/25


Principal diagnosis: 





Shortness of breath, pulmonary edema, fluid retention.





Pulmonary consult dated March 7, 2025.





64-year-old male who initially presented to the emergency department, on March 2, complaining of abdominal pain.  He also complained of shortness of breath.  

Apparently been having shortness of breath for about 2 weeks prior to admission.

 In addition, he complains of edema of both lower extremities, and abdomen.  He 

also has some pain in the abdomen, prior to arrival.  The patient apparently 

used to see Dr. Hernández, the other pulmonologist, for chronic bronchial asthma.  

The patient states that he smoked for only a few years.  He does have a history 

of documented obstructive sleep apnea syndrome, but apparently his CPAP device 

does not work.  He is seen today in room 372.  He is sitting on the edge of the 

bed.  He is currently on 3 L of oxygen.  There is a CPAP device in his room.  

The patient used to be on inhalers, for his chronic bronchial asthma.  He did 

have a blood gas done on the fifth, showing a pO2 of 78, pCO2 of 76, pH is 7.34.

 The patient is quite obese, may have obesity/hypoventilation syndrome 

(Pickwickian syndrome).  It should be noted that the patient is not a 

particularly good historian.  Current laboratory data includes a white count 

13.6, hemoglobin 13.8, hematocrit 45.7, and platelet count 167,000.  Sodium 139,

potassium 3.7, chlorides 90, CO2 42, BUN 23, and creatinine 0.93.  Glucose is 

121.  Calcium is 9.  Previous chest x-ray was consistent with fluid overload.





Progress note dated March 8, 2025.





64-year-old male who looks much older than his stated age, seen again today in 

room 372.  He is currently on BiPAP, with settings of 12/5, and 40%.  When not 

on BiPAP, using nasal O2 at 3 L.  The patient used to see Dr. Hernández, the other 

pulmonologist, for his sleep apnea syndrome.  The patient is morbidly obese, and

I asked him to go back and see Dr. Hernández once discharged.  He is feeling much 

better.  His ex-wife is in the room.  White count 10.3, hemoglobin 14.3, 

hematocrit 46.4, and platelet count 158,000.  Sodium 135, potassium 4.2, 

chloride 88, CO2 40, BUN 25, and creatinine 0.71.  Glucose is 120.  Calcium is 

9.2.





Objective





- Vital Signs


Vital signs: 


                                   Vital Signs











Temp  98.1 F   03/08/25 12:40


 


Pulse  65   03/08/25 12:40


 


Resp  18   03/08/25 12:40


 


BP  148/66   03/08/25 12:40


 


Pulse Ox  92 L  03/08/25 12:40


 


FiO2  40   03/08/25 11:47








                                 Intake & Output











 03/07/25 03/08/25 03/08/25





 18:59 06:59 18:59


 


Intake Total 1374  240


 


Output Total 1000  700


 


Balance 374  -460


 


Weight  166.2 kg 


 


Intake:   


 


  Oral 1374  240


 


Output:   


 


  Urine 1000  700


 


Other:   


 


  Voiding Method Toilet Toilet 


 


  # Bowel Movements 1  














- Exam





No acute distress, oriented 3.  Currently on BiPAP.





HEENT examination is grossly unremarkable.  Mucous membranes are moist.  No oral

lesions.





Neck supple.  Full range of motion.  No adenopathy thyromegaly or neck vein 

distention.





Cardiovascular examination reveals regular rhythm rate.  S1-S2 normal.  No S3 or

S4.  No discernible murmur noted.  Heart sounds are distant.





Lungs reveal scattered rhonchi.  There are some basilar crackles.  No wheezes.  

Breath sounds are equal bilaterally.





Abdomen obese, with bowel sounds.  No masses or tenderness.





Extremities reveal lower extremity edema.  No cyanosis or clubbing.





Skin is without rash or lesion.





Neurologic examination is brief but nonfocal.








- Labs


CBC & Chem 7: 


                                 03/08/25 07:04





                                 03/08/25 07:04


Labs: 


                  Abnormal Lab Results - Last 24 Hours (Table)











  03/08/25 03/08/25 Range/Units





  07:04 07:04 


 


RDW  16.0 H   (11.5-15.5)  %


 


Neutrophils #  8.5 H   (1.3-7.7)  k/uL


 


Lymphocytes #  0.8 L   (1.0-4.8)  k/uL


 


Sodium   135 L  (137-145)  mmol/L


 


Chloride   88 L  ()  mmol/L


 


Carbon Dioxide   40 H  (22-30)  mmol/L


 


BUN   25 H  (9-20)  mg/dL


 


Glucose   120 H  (74-99)  mg/dL














Assessment and Plan


Assessment: 





Acute hypoxemic respiratory failure, likely multifactorial, in part related to 

fluid overload/CHF, chronic bronchial asthma, sleep apnea syndrome, obesity, and

Pickwickian syndrome.





History of hypertension.





History of obstructive sleep apnea syndrome, noncompliant with CPAP.





Prior history of minimal tobacco use.





History of chronic bronchial asthma.





Morbid obesity.





                                                                           


Plan: 





Plan dated March 7, 2025.





The patient is seen and interviewed.  He is examined.  He is seen today in room 

372.  He is sitting at the side of the bed.  The CPAP device is in the room.  He

apparently was seeing Dr. Hernández in the past, the other pulmonologist.  He 

apparently was diagnosed as having sleep apnea, placed on CPAP, but apparently 

his CPAP device does not currently work.  The patient is currently on 3 L.  No 

IV fluids.  We add albuterol and Symbicort.  The albuterol to be used 2 puffs as

needed.  The patient will continue on oxygen.  Labs, x-rays, and medications are

reviewed.  The patient's overall prognosis remains poor.  He should follow-up 

with Dr. Hernández after discharge.  Blood gases done on March 1 show pO2 of 78, 

pCO2 of 76, pH of 7.34.  The patient likely has right-sided heart failure, may 

be from his obesity and Pickwickian syndrome, or his sleep apnea syndrome.  

Prognosis is poor.  Dictation was produced using Dragon dictation software.  

Please excuse any grammatical, word or spelling errors.





Plan dated March 8, 2025.





The patient is seen today in room 372.  The patient used to see the other 

pulmonologist, Dr. Hernández.  The patient was being followed by the other 

pulmonologist, for the patient's underlying sleep apnea syndrome.  He has been 

noncompliant with CPAP.  Currently, the patient is on BiPAP, with settings of 

12/5, and 40%.  He is not receiving any IV fluids.  When not on BiPAP, he is on 

nasal O2 at 3 L.  Labs, x-rays, and medications are reviewed.  The patient's ex-

wife is in the room.  I told her and him, that he should follow-up with Dr. Hernández after discharge.  We will continue to follow make recommendations.  Labs, 

x-rays, and medications are reviewed.  The patient is overall prognosis is poor.

 The patient needs to lose weight, as he is morbidly obese.  Dictation was 

produced using Dragon dictation software.  Please excuse any grammatical, word 

or spelling errors.








Time with Patient: Less than 30

## 2025-03-08 NOTE — P.PN
Subjective





64-year-old male who presents to the emergency department for abdominal pain and

shortness of breath.  States that he has been dealing with shortness of breath 

for the last 2 weeks.  He has also started to notice swelling in his lower 

extremities and abdomen.  Today he felt a pain in his left upper quadrant that 

occurred about 2 hours prior to arrival.  This has since persisted, prompting 

him to come here for evaluation.  Denies any nausea or vomiting.  Denies any 

changes in bowel or bladder habits.  Denies any history of respiratory or cardi

ac problems.


Patient hypoxic on arrival with an oxygen of 87%.  This improved with 2 L via 

nasal cannula.  


Lab work demonstrates leukocytosis with a white blood cell count of 17.5.  D-

dimer negative.  Lactic acid elevated at 2.3.  BNP elevated at 2780 and troponin

elevated at 0.057.  Patient denies a history of CHF.  


Chest x-ray demonstrates cardiomegaly with pulmonary vascular congestion. 


COVID, influenza, and RSV testing negative.  


Urinalysis negative for signs of infection.  


Given his abdominal pain ER attempted to get a CT scan on the patient.  However,

he could not comfortably lay flat for the testing. 





3/3/2025


Patient is seen in follow-up today with cardiology following.  Patient is being 

started on IV Lasix and was given a dose of Diamox.  2D echo ordered and pending

and patient denies any previous history of heart failure.  Patient reports 

significant swelling although is also morbidly obese with significantly large 

body habitus.  Patient currently living in a nursing home house and is noncompliant 

with any medication and outpatient follow-up.  Patient is short of breath and 

continues to be short of breath with conversation and minimal exertion.  Patient

will need outpatient evaluation by pulmonary for sleep apnea





3/4/2025


Patient is seen in follow-up today continues on IV Lasix with  cardiology 

following.  Lasix is being increased and adjustments to medications including 

adding Farxiga.  Patient and family inquiring when patient can potentially go 

home.  Patient is currently maintained on oxygen and will likely need oxygen on 

discharge as patient desats quickly into the high 70s and low 80s.  Encouraged 

increase activity as tolerated





3/5/2025


Patient is seen in follow-up today continues on IV Lasix with cardiology 

following continues with significant volume overload and 1-2+ pitting edema of 

lower extremities.  Patient is extremely lethargic today maintained on 3 L of 

oxygen although is found often per nursing staff to have his oxygen off and 

desats quickly into the 70s.  Patient will most likely require oxygen on 

discharge as there is high concerns with obesity hypoventilation syndrome.  Per 

nursing staff patient is mostly sleeping throughout the day and will obtain 

ABGs.  Carbon dioxide at 41 today otherwise kidney function stable with a sodium

of 138 potassium 4.0, BUN 13 and creatinine 0.83.  Patient currently resides at 

a nursing home house and unsure of discharge planning.  Recommend PT/OT therapy 

evaluation and case management/social work consult.





03/06/2025


Is eval today sitting up in the chair.  He reports improvement in his shortness 

of breath he seems to be more awake and alert as compared to yesterday.  He was 

placed on a BiPAP with settings of 12/5 at 40% FiO2 yesterday secondary to ABGs 

revealing pH level of 7.34, pCO2 of 76 pO2 of 78 HCO3 of 41 and a total CO2 

level of 43.  Today's electrolytes and renal function are stable.  His white 

blood cell count is 13.1.  He continues to have significant lower extremity 

edema.  He has been continued on IV Lasix and diuresing well as he keeps having 

to get up to the restroom he has not been able to wear the BiPAP much today.  

Cardiogram comes back revealing an EF of 55 to 60% with severe right ventricle 

dilation with moderate pulmonary hypertension.  Mild mitral and tricuspid r

egurgitation and mild pulmonary hypertension.





3/7/2025


Patient is seen in follow-up today continues on IV Lasix.  Patient transition to

CPAP at night and tolerating and will continue nasal cannula.  Patient desats 

quickly and will likely require oxygen on discharge.  Patient continues with 

high-dose Lasix and diuresing and will follow-up on repeat labs.  Encouraged 

increase activity as tolerated.





3/8


Patient did not use his CPAP last night, he was little bit more tired today and 

short of breath, currently his have the CPAP on, he is able to interact and get 

to the commode by himself


Currently he is on 3 L oxygen, at home he was not on oxygen.


Wife at bedside and asking when he can go home





Objective





- Vital Signs


Vital signs: 


                                   Vital Signs











Temp  97.8 F   03/08/25 09:17


 


Pulse  78   03/08/25 12:00


 


Resp  20   03/08/25 09:17


 


BP  124/72   03/08/25 09:17


 


Pulse Ox  94 L  03/08/25 09:17


 


FiO2  40   03/08/25 11:47








                                 Intake & Output











 03/07/25 03/08/25 03/08/25





 18:59 06:59 18:59


 


Intake Total 1374  0


 


Output Total 1000  


 


Balance 374  0


 


Weight  166.2 kg 


 


Intake:   


 


  Oral 1374  0


 


Output:   


 


  Urine 1000  


 


Other:   


 


  Voiding Method Toilet Toilet 


 


  # Bowel Movements 1  














- Exam





.  GENERAL: The patient is alert and oriented x3, not in any acute distress. 

Well developed, well nourished.  Obese


HEENT: Pupils are round and equally reacting to light. EOMI. No scleral icterus.

No conjunctival pallor. Normocephalic, atraumatic. No pharyngeal erythema. No 

thyromegaly. 


CARDIOVASCULAR: S1 and S2 present. No murmurs, rubs, or gallops. 


PULMONARY: Chest is clear to auscultation, no wheezing , no crackles. 


ABDOMEN: Soft, nontender, nondistended, normoactive bowel sounds. No palpable 

organomegaly. 


MUSCULOSKELETAL: No joint swelling or deformity. 


EXTREMITIES: No cyanosis, clubbing, or pedal edema. 


NEUROLOGICAL: Gross neurological examination did not reveal any focal deficits. 


SKIN: No rashes. no petechiae.








- Labs


CBC & Chem 7: 


                                 03/08/25 07:04





                                 03/08/25 07:04


Labs: 


                  Abnormal Lab Results - Last 24 Hours (Table)











  03/08/25 03/08/25 Range/Units





  07:04 07:04 


 


RDW  16.0 H   (11.5-15.5)  %


 


Neutrophils #  8.5 H   (1.3-7.7)  k/uL


 


Lymphocytes #  0.8 L   (1.0-4.8)  k/uL


 


Sodium   135 L  (137-145)  mmol/L


 


Chloride   88 L  ()  mmol/L


 


Carbon Dioxide   40 H  (22-30)  mmol/L


 


BUN   25 H  (9-20)  mg/dL


 


Glucose   120 H  (74-99)  mg/dL














Assessment and Plan


Assessment: 








-New onset CHF; acute exacerbation, pulmonary vascular congestion noted on 

imaging and BNP was elevated at 2780, preserved EF of 55 to 60%


-Acute hypoxic respiratory failure secondary to above, maintained on 3 L


-Leukocytosis/possible UTI; lactic acid is elevated at 2.3; no signs of 

infection; chest x-ray is negative for any acute process


-Elevated troponin; type II secondary to supply demand mismatch 


-Hypertension; lisinopril 20 mg daily


-Morbid obesity with a BMI of 55.6; possible obesity hypoventilation syndrome, 

will need outpatient testing


-New diabetes mellitus type 2 with hemoglobin A1C of 7.9. 








Plan: 











Patient is continued on IV Lasix with cardiology following; Recommend strict 

intake and output monitoring 


Continue supportive supplemental oxygen at 3 L and does not wear oxygen 

outpatient.  Likely sleep apnea although undiagnosed concerns for obesity 

hypoventilation syndrome


Patient has been started on CPAP with pulmonary following will continue nasal 

cannula and wean FiO2 as tolerated.  Patient normally does not wear oxygen 

outpatient and will likely require oxygen on discharge


Patient has been started on oral diamox daily 


Recommend PT/OT therapy evaluation and encouraged increase activity as tolerated


Patient is sitting up in the chair mostly sleeping


Case management/social work to evaluate as patient currently lives at a nursing home 

house and unsure of discharge planning at this time.


Continue to monitor electrolytes and renal function 





GI prophylaxis


DVT prophylaxis; SCDs/subcu heparin


CODE STATUS; full code

## 2025-03-09 NOTE — P.PN
Subjective





64-year-old male who presents to the emergency department for abdominal pain and

shortness of breath.  States that he has been dealing with shortness of breath 

for the last 2 weeks.  He has also started to notice swelling in his lower 

extremities and abdomen.  Today he felt a pain in his left upper quadrant that 

occurred about 2 hours prior to arrival.  This has since persisted, prompting 

him to come here for evaluation.  Denies any nausea or vomiting.  Denies any 

changes in bowel or bladder habits.  Denies any history of respiratory or cardi

ac problems.


Patient hypoxic on arrival with an oxygen of 87%.  This improved with 2 L via 

nasal cannula.  


Lab work demonstrates leukocytosis with a white blood cell count of 17.5.  D-

dimer negative.  Lactic acid elevated at 2.3.  BNP elevated at 2780 and troponin

elevated at 0.057.  Patient denies a history of CHF.  


Chest x-ray demonstrates cardiomegaly with pulmonary vascular congestion. 


COVID, influenza, and RSV testing negative.  


Urinalysis negative for signs of infection.  


Given his abdominal pain ER attempted to get a CT scan on the patient.  However,

he could not comfortably lay flat for the testing. 





3/3/2025


Patient is seen in follow-up today with cardiology following.  Patient is being 

started on IV Lasix and was given a dose of Diamox.  2D echo ordered and pending

and patient denies any previous history of heart failure.  Patient reports 

significant swelling although is also morbidly obese with significantly large 

body habitus.  Patient currently living in a group home house and is noncompliant 

with any medication and outpatient follow-up.  Patient is short of breath and 

continues to be short of breath with conversation and minimal exertion.  Patient

will need outpatient evaluation by pulmonary for sleep apnea





3/4/2025


Patient is seen in follow-up today continues on IV Lasix with  cardiology 

following.  Lasix is being increased and adjustments to medications including 

adding Farxiga.  Patient and family inquiring when patient can potentially go 

home.  Patient is currently maintained on oxygen and will likely need oxygen on 

discharge as patient desats quickly into the high 70s and low 80s.  Encouraged 

increase activity as tolerated





3/5/2025


Patient is seen in follow-up today continues on IV Lasix with cardiology 

following continues with significant volume overload and 1-2+ pitting edema of 

lower extremities.  Patient is extremely lethargic today maintained on 3 L of 

oxygen although is found often per nursing staff to have his oxygen off and 

desats quickly into the 70s.  Patient will most likely require oxygen on 

discharge as there is high concerns with obesity hypoventilation syndrome.  Per 

nursing staff patient is mostly sleeping throughout the day and will obtain 

ABGs.  Carbon dioxide at 41 today otherwise kidney function stable with a sodium

of 138 potassium 4.0, BUN 13 and creatinine 0.83.  Patient currently resides at 

a group home house and unsure of discharge planning.  Recommend PT/OT therapy 

evaluation and case management/social work consult.





03/06/2025


Is eval today sitting up in the chair.  He reports improvement in his shortness 

of breath he seems to be more awake and alert as compared to yesterday.  He was 

placed on a BiPAP with settings of 12/5 at 40% FiO2 yesterday secondary to ABGs 

revealing pH level of 7.34, pCO2 of 76 pO2 of 78 HCO3 of 41 and a total CO2 

level of 43.  Today's electrolytes and renal function are stable.  His white 

blood cell count is 13.1.  He continues to have significant lower extremity 

edema.  He has been continued on IV Lasix and diuresing well as he keeps having 

to get up to the restroom he has not been able to wear the BiPAP much today.  

Cardiogram comes back revealing an EF of 55 to 60% with severe right ventricle 

dilation with moderate pulmonary hypertension.  Mild mitral and tricuspid r

egurgitation and mild pulmonary hypertension.





3/7/2025


Patient is seen in follow-up today continues on IV Lasix.  Patient transition to

CPAP at night and tolerating and will continue nasal cannula.  Patient desats 

quickly and will likely require oxygen on discharge.  Patient continues with 

high-dose Lasix and diuresing and will follow-up on repeat labs.  Encouraged 

increase activity as tolerated.





3/8


Patient did not use his CPAP last night, he was little bit more tired today and 

short of breath, currently his have the CPAP on, he is able to interact and get 

to the commode by himself


Currently he is on 3 L oxygen, at home he was not on oxygen.


Wife at bedside and asking when he can go home





3/9


Patient used CPAP last night and early this morning and then he took it off and 

currently on 3 L oxygen via nasal cannula


He still feels somewhat lethargic but better than yesterday


He has weeping lower extremity, left leg there is also some oozing.


He remains on Bumex 1 mg twice daily and metolazone.  We added fluid 

restriction.


Check labs in the morning





Objective





- Vital Signs


Vital signs: 


                                   Vital Signs











Temp  97.3 F L  03/09/25 11:49


 


Pulse  63   03/09/25 11:49


 


Resp  16   03/09/25 11:49


 


BP  137/73   03/09/25 11:49


 


Pulse Ox  96   03/09/25 11:49


 


FiO2  40   03/09/25 04:23








                                 Intake & Output











 03/08/25 03/09/25 03/09/25





 17:59 06:59 18:59


 


Intake Total   140


 


Output Total   100


 


Balance   40


 


Intake:   


 


  IV   20


 


    Invasive Line 1   10


 


    Invasive Line 2   10


 


  Oral   120


 


Output:   


 


  Urine   100


 


Other:   


 


  Voiding Method   Toilet





   Bedside Commode


 


  # Voids   1














- Exam





.  GENERAL: The patient is alert and oriented x3, not in any acute distress. 

Well developed, well nourished.  Obese


HEENT: Pupils are round and equally reacting to light. EOMI. No scleral icterus.

No conjunctival pallor. Normocephalic, atraumatic. No pharyngeal erythema. No t

hyromegaly. 


CARDIOVASCULAR: S1 and S2 present. No murmurs, rubs, or gallops. 


PULMONARY: Chest is clear to auscultation, no wheezing , no crackles. 


ABDOMEN: Soft, nontender, nondistended, normoactive bowel sounds. No palpable 

organomegaly. 


MUSCULOSKELETAL: No joint swelling or deformity. 


EXTREMITIES: No cyanosis, clubbing, or pedal edema. 


NEUROLOGICAL: Gross neurological examination did not reveal any focal deficits. 


SKIN: No rashes. no petechiae.








- Labs


CBC & Chem 7: 


                                 03/08/25 07:04





                                 03/08/25 07:04





Assessment and Plan


Assessment: 








-New onset CHF; acute exacerbation, pulmonary vascular congestion noted on 

imaging and BNP was elevated at 2780, preserved EF of 55 to 60%


-Acute hypoxic respiratory failure secondary to above, maintained on 3 L


-Leukocytosis/possible UTI; lactic acid is elevated at 2.3; no signs of 

infection; chest x-ray is negative for any acute process


-Elevated troponin; type II secondary to supply demand mismatch 


-Hypertension; lisinopril 20 mg daily


-Morbid obesity with a BMI of 55.6; possible obesity hypoventilation syndrome, 

will need outpatient testing


-New diabetes mellitus type 2 with hemoglobin A1C of 7.9. 








Plan: 











Patient is continued on IV Lasix with cardiology following; Recommend strict 

intake and output monitoring 


Continue supportive supplemental oxygen at 3 L and does not wear oxygen 

outpatient.  Likely sleep apnea although undiagnosed concerns for obesity 

hypoventilation syndrome


Patient has been started on CPAP with pulmonary following will continue nasal 

cannula and wean FiO2 as tolerated.  Patient normally does not wear oxygen 

outpatient and will likely require oxygen on discharge


Patient has been started on oral diamox daily 


Recommend PT/OT therapy evaluation and encouraged increase activity as tolerated


Patient is sitting up in the chair mostly sleeping


Case management/social work to evaluate as patient currently lives at a group home 

house and unsure of discharge planning at this time.


Continue to monitor electrolytes and renal function 





GI prophylaxis


DVT prophylaxis; SCDs/subcu heparin


CODE STATUS; full code

## 2025-03-09 NOTE — P.PN
Subjective


Progress Note Date: 03/08/25





64-year-old male with past medical history of hypertension presents with 

complaints of abdominal pain and shortness of breath.  Cardiology consulted for 

new onset CHF.  States that shortness of breath was getting worse over the last 

2 weeks and during this time also noticed swelling in the bilateral lower 

extremities.  Admits to orthopnea during this time, states he is only able to 

sleep in a recliner.  Reports he previously was on lisinopril and 

hydrochlorothiazide up until October of last year when he had an insurance 

problem and was no longer able to receive the medication.  Admits to being a smo

ker 1 pack a day 20 years ago.  Most recent echo in 2018 showed EF 50 to 55% 

with left atrial dilation.  Past surgical and family history unremarkable.  No 

previous cardiac procedures.





Vitals: Heart rate 94, respiratory rate 18, /83, O2 saturation 96% on 3 L


Labs: Significant for WBC 14.9, hemoglobin 14, , troponin peaked at 

0.068, BNP 2780, cholesterol 180, , and HDL 44


EKG: Sinus tachycardia


CXR: Cardiomegaly plus pulmonary vascular congestion





3/4


Patient seen and examined in the cardiac stepdown unit.  No significant events. 

Yesterday, patient underwent echo which showed EF 55 to 60% with no obvious wall

motion abnormalities.  Overnight patient had bursts of PVT's lasting less than 

30 seconds.  TSH and magnesium ordered yesterday were within normal limits, A1c 

was 7.9.  Blood pressures 122/49, heart rate 75, pulse ox 91% on 3 L.  Repeat 

blood work reveals WBC 13.9, hemoglobin 13.4, sodium 139, potassium 4.1, 

chloride 93, bicarb 39, glucose 137, and TSH 1.19.  Patient continues to have 

good urine output, documented that he is put out at least 1 L but after speaking

with nursing staff they report the patient "does not aim well "and they believe 

his urine output is greater than what is actually recorded.





3/5


Patient seen and examined in the cardiac stepdown unit.  No significant 

overnight events.  Patient had a urine output of 2 L in the last 24 hours, medardo hassan continue to mention that he has trouble aiming and believe the urine 

output is greater than is what is actually being recorded.  Heart rate 62, 

respiratory rate 18, blood pressure 139/77, O2 saturation 98% on 3 L.  Repeat 

blood work reveals WBC 13.2, hemoglobin 13.3, sodium 138, bicarb 41.  Patient 

continues to have lower extremity swelling with no marked improvement from 

previous days.





3/6/2025


Patient still continues to be very lethargic and short of breath.  We did try to

give him CPAP last night however he did not use it much.  Noticed to be 

hypertensive today, kidney function is stable, significantly hypercapnic.





3/7/2025


Patient is very lethargic still short of breath.  BP still uncontrolled with 

some readings at 170/84, repeat 143/63, heart rate 67 bpm.





3/8/2025


Patient is very lethargic and still short of breath.


Denies any chest pain chest pressure, still appears volume overloaded.  Kidney 

function is stayed stable,





PHYSICAL EXAM


Vital signs reviewed.


General: non toxic, no distress, morbidly obese


HEENT: Head is normocephalic. Pupils are equal, round. Sclerae anicteric. Mucous

membranes of the mouth are moist.  No JVD. No carotid bruit.


Cardiovascular: S1S2 reg, no murmur, 1+ pitting edema bilaterally up to the 

knee, worse in left leg


Lungs: Decreased air entry bilaterally, no rhonchi, no rales, no accessory 

muscle use, mild wheezing auscultated bilaterally


Abdominal: soft,  nontender to palpation, no guarding


NEUROLOGIC EXAMINATION: Patient is awake, alert and oriented x3. 





ASSESSMENT


-Acute HFpEF exacerbation


-Type II NSTEMI due to CHF exacerbation


-Acute hypoxic and hypercapnic respiratory failure


Newly diagnosed type 2 diabetes, HbA1c 7.9,


Dyslipidemia, LDL 92,


-Hypercapnia


-Undiagnosed JUAREZ with pickwickian syndrome


-Morbid obesity


-Hypertension





PLAN


Continue losartan to 100 mg daily


Continue start Bumex 1 mg p.o. twice daily, metolazone 2.5 mg daily, Farxiga 10 

mg, Aldactone 25 mg


Continue aspirin, Lipitor, Coreg 6.25 mL twice daily


Recommend JUAREZ management.  Home O2 evaluation prior to discharge





At this time patient is cleared from cardiovascular standpoint.  Cardiology team

will sign off.  Please reconsult us in case of any question.


Discharge disposition, social work consult


Patient will need ischemic evaluation and pulmonary hypertension with a left and

right heart catheterization on outpatient basis.





Objective





- Vital Signs


Vital signs: 


                                   Vital Signs











Temp  97.7 F   03/09/25 17:00


 


Pulse  64   03/09/25 17:00


 


Resp  22   03/09/25 17:00


 


BP  111/64   03/09/25 17:00


 


Pulse Ox  92 L  03/09/25 17:00


 


FiO2  40   03/09/25 04:23








                                 Intake & Output











 03/09/25 03/09/25 03/10/25





 06:59 18:59 06:59


 


Intake Total  920 


 


Output Total  300 


 


Balance  620 


 


Intake:   


 


  IV  20 


 


    Invasive Line 1  10 


 


    Invasive Line 2  10 


 


  Oral  900 


 


Output:   


 


  Urine  300 


 


Other:   


 


  Voiding Method  Toilet 





  Bedside Commode 


 


  # Voids  1 


 


  # Bowel Movements  1 














- Labs


CBC & Chem 7: 


                                 03/08/25 07:04





                                 03/08/25 07:04

## 2025-03-09 NOTE — P.PN
Subjective


Progress Note Date: 03/09/25


Principal diagnosis: 





Shortness of breath, pulmonary edema, fluid retention.





Pulmonary consult dated March 7, 2025.





64-year-old male who initially presented to the emergency department, on March 2, complaining of abdominal pain.  He also complained of shortness of breath.  

Apparently been having shortness of breath for about 2 weeks prior to admission.

 In addition, he complains of edema of both lower extremities, and abdomen.  He 

also has some pain in the abdomen, prior to arrival.  The patient apparently 

used to see Dr. Hernández, the other pulmonologist, for chronic bronchial asthma.  

The patient states that he smoked for only a few years.  He does have a history 

of documented obstructive sleep apnea syndrome, but apparently his CPAP device 

does not work.  He is seen today in room 372.  He is sitting on the edge of the 

bed.  He is currently on 3 L of oxygen.  There is a CPAP device in his room.  

The patient used to be on inhalers, for his chronic bronchial asthma.  He did 

have a blood gas done on the fifth, showing a pO2 of 78, pCO2 of 76, pH is 7.34.

 The patient is quite obese, may have obesity/hypoventilation syndrome 

(Pickwickian syndrome).  It should be noted that the patient is not a 

particularly good historian.  Current laboratory data includes a white count 

13.6, hemoglobin 13.8, hematocrit 45.7, and platelet count 167,000.  Sodium 139,

potassium 3.7, chlorides 90, CO2 42, BUN 23, and creatinine 0.93.  Glucose is 

121.  Calcium is 9.  Previous chest x-ray was consistent with fluid overload.





Progress note dated March 8, 2025.





64-year-old male who looks much older than his stated age, seen again today in 

room 372.  He is currently on BiPAP, with settings of 12/5, and 40%.  When not 

on BiPAP, using nasal O2 at 3 L.  The patient used to see Dr. Hernández, the other 

pulmonologist, for his sleep apnea syndrome.  The patient is morbidly obese, and

I asked him to go back and see Dr. Hernández once discharged.  He is feeling much 

better.  His ex-wife is in the room.  White count 10.3, hemoglobin 14.3, 

hematocrit 46.4, and platelet count 158,000.  Sodium 135, potassium 4.2, 

chloride 88, CO2 40, BUN 25, and creatinine 0.71.  Glucose is 120.  Calcium is 

9.2.





Progress note dated March 9, 2025.





64-year-old male seen today in room 372.  The patient has a history of sleep 

apnea syndrome, and may also suffer from Pickwickian syndrome.  The patient sees

Dr. Hernández, from the other pulmonary group.  He is currently on 3 L nasal O2.  He

is also getting saline at 10 cc an hour.  In addition, he uses BiPAP, 

intermittently, with settings of 12/5, and 40%.  According to the patient's ex-

wife, the patient has not been real good about his health care.  No new labs 

today.





Objective





- Vital Signs


Vital signs: 


                                   Vital Signs











Temp  97.3 F L  03/09/25 11:49


 


Pulse  63   03/09/25 12:50


 


Resp  16   03/09/25 11:49


 


BP  137/73   03/09/25 11:49


 


Pulse Ox  96   03/09/25 11:49


 


FiO2  40   03/09/25 04:23








                                 Intake & Output











 03/08/25 03/09/25 03/09/25





 17:59 06:59 18:59


 


Intake Total   140


 


Output Total   100


 


Balance   40


 


Intake:   


 


  IV   20


 


    Invasive Line 1   10


 


    Invasive Line 2   10


 


  Oral   120


 


Output:   


 


  Urine   100


 


Other:   


 


  Voiding Method   Toilet





   Bedside Commode


 


  # Voids   1














- Exam





No acute distress, oriented 3.  Currently on 3 liters nasal cannula.  No 

respiratory distress.  The patient is sitting in the chair, next to the hospital

bed.





HEENT examination is grossly unremarkable.  Mucous membranes are moist.  No oral

lesions.





Neck supple.  Full range of motion.  No adenopathy thyromegaly or neck vein 

distention.





Cardiovascular examination reveals regular rhythm rate.  S1-S2 normal.  No S3 or

S4.  No discernible murmur noted.  Heart sounds are distant.





Lungs reveal scattered rhonchi.  There are some basilar crackles.  No wheezes.  

Breath sounds are equal bilaterally.





Abdomen obese, with bowel sounds.  No masses or tenderness.





Extremities reveal lower extremity edema.  No cyanosis or clubbing.





Skin is without rash or lesion.





Neurologic examination is brief but nonfocal.








- Labs


CBC & Chem 7: 


                                 03/08/25 07:04





                                 03/08/25 07:04





Assessment and Plan


Assessment: 





Acute hypoxemic respiratory failure, likely multifactorial, in part related to 

fluid overload/CHF, chronic bronchial asthma, sleep apnea syndrome, obesity, and

Pickwickian syndrome.





History of hypertension.





History of obstructive sleep apnea syndrome, noncompliant with CPAP.





Prior history of minimal tobacco use.





History of chronic bronchial asthma.





Morbid obesity.





                                                                           


Plan: 





Plan dated March 7, 2025.





The patient is seen and interviewed.  He is examined.  He is seen today in room 

372.  He is sitting at the side of the bed.  The CPAP device is in the room.  He

apparently was seeing Dr. Hernández in the past, the other pulmonologist.  He 

apparently was diagnosed as having sleep apnea, placed on CPAP, but apparently 

his CPAP device does not currently work.  The patient is currently on 3 L.  No 

IV fluids.  We add albuterol and Symbicort.  The albuterol to be used 2 puffs as

needed.  The patient will continue on oxygen.  Labs, x-rays, and medications are

reviewed.  The patient's overall prognosis remains poor.  He should follow-up 

with Dr. Hernández after discharge.  Blood gases done on March 1 show pO2 of 78, 

pCO2 of 76, pH of 7.34.  The patient likely has right-sided heart failure, may 

be from his obesity and Pickwickian syndrome, or his sleep apnea syndrome.  

Prognosis is poor.  Dictation was produced using Dragon dictation software.  

Please excuse any grammatical, word or spelling errors.





Plan dated March 8, 2025.





The patient is seen today in room 372.  The patient used to see the other 

pulmonologist, Dr. Hernández.  The patient was being followed by the other 

pulmonologist, for the patient's underlying sleep apnea syndrome.  He has been 

noncompliant with CPAP.  Currently, the patient is on BiPAP, with settings of 

12/5, and 40%.  He is not receiving any IV fluids.  When not on BiPAP, he is on 

nasal O2 at 3 L.  Labs, x-rays, and medications are reviewed.  The patient's ex-

wife is in the room.  I told her and him, that he should follow-up with Dr. Hernández after discharge.  We will continue to follow make recommendations.  Labs, 

x-rays, and medications are reviewed.  The patient is overall prognosis is poor.

 The patient needs to lose weight, as he is morbidly obese.  Dictation was 

produced using Dragon dictation software.  Please excuse any grammatical, word 

or spelling errors.





Plan dated March 9, 2025.





The patient is seen today in room 372.  The patient alternates between 3 L nasal

cannula, and BiPAP.  Currently he is receiving saline at 10 cc an hour.  The 

patient follows with the other pulmonologist, Dr. Hernández.  The patient has not 

been good about using his CPAP, as prescribed.  Labs, x-rays, and medications 

are reviewed.  We will continue to follow.  Prognosis is guarded.  No additional

recommendations at this time.  No new labs.  Dictation was produced using Dragon

dictation software.  Please excuse any grammatical, word or spelling errors.








Time with Patient: Less than 30

## 2025-03-10 VITALS — HEART RATE: 70 BPM

## 2025-03-10 VITALS — RESPIRATION RATE: 18 BRPM | SYSTOLIC BLOOD PRESSURE: 131 MMHG | DIASTOLIC BLOOD PRESSURE: 70 MMHG

## 2025-03-10 VITALS — TEMPERATURE: 98.3 F

## 2025-03-10 LAB
ANION GAP SERPL CALC-SCNC: 9 MMOL/L
BASOPHILS # BLD AUTO: 0.1 K/UL (ref 0–0.2)
BASOPHILS NFR BLD AUTO: 1 %
BUN SERPL-SCNC: 39 MG/DL (ref 9–20)
CALCIUM SPEC-MCNC: 9.3 MG/DL (ref 8.4–10.2)
CHLORIDE SERPL-SCNC: 84 MMOL/L (ref 98–107)
CO2 SERPL-SCNC: 44 MMOL/L (ref 22–30)
EOSINOPHIL # BLD AUTO: 0.2 K/UL (ref 0–0.7)
EOSINOPHIL NFR BLD AUTO: 2 %
ERYTHROCYTE [DISTWIDTH] IN BLOOD BY AUTOMATED COUNT: 4.79 M/UL (ref 4.3–5.9)
ERYTHROCYTE [DISTWIDTH] IN BLOOD: 15.5 % (ref 11.5–15.5)
GLUCOSE SERPL-MCNC: 108 MG/DL (ref 74–99)
HCT VFR BLD AUTO: 46.6 % (ref 39–53)
HGB BLD-MCNC: 14.5 GM/DL (ref 13–17.5)
LYMPHOCYTES # SPEC AUTO: 0.9 K/UL (ref 1–4.8)
LYMPHOCYTES NFR SPEC AUTO: 9 %
MCH RBC QN AUTO: 30.3 PG (ref 25–35)
MCHC RBC AUTO-ENTMCNC: 31.2 G/DL (ref 31–37)
MCV RBC AUTO: 97.3 FL (ref 80–100)
MONOCYTES # BLD AUTO: 0.7 K/UL (ref 0–1)
MONOCYTES NFR BLD AUTO: 8 %
NEUTROPHILS # BLD AUTO: 7.3 K/UL (ref 1.3–7.7)
NEUTROPHILS NFR BLD AUTO: 79 %
PLATELET # BLD AUTO: 161 K/UL (ref 150–450)
POTASSIUM SERPL-SCNC: 3.2 MMOL/L (ref 3.5–5.1)
SODIUM SERPL-SCNC: 137 MMOL/L (ref 137–145)
WBC # BLD AUTO: 9.2 K/UL (ref 3.8–10.6)

## 2025-03-10 RX ADMIN — POTASSIUM CHLORIDE SCH MEQ: 20 TABLET, EXTENDED RELEASE ORAL at 10:01

## 2025-03-10 NOTE — P.PN
Subjective


Progress Note Date: 03/10/25





64-year-old male with past medical history of hypertension presents with 

complaints of abdominal pain and shortness of breath.  Cardiology consulted for 

new onset CHF.  States that shortness of breath was getting worse over the last 

2 weeks and during this time also noticed swelling in the bilateral lower 

extremities.  Admits to orthopnea during this time, states he is only able to 

sleep in a recliner.  Reports he previously was on lisinopril and 

hydrochlorothiazide up until October of last year when he had an insurance 

problem and was no longer able to receive the medication.  Admits to being a smo

ker 1 pack a day 20 years ago.  Most recent echo in 2018 showed EF 50 to 55% 

with left atrial dilation.  Past surgical and family history unremarkable.  No 

previous cardiac procedures.





Vitals: Heart rate 94, respiratory rate 18, /83, O2 saturation 96% on 3 L


Labs: Significant for WBC 14.9, hemoglobin 14, , troponin peaked at 

0.068, BNP 2780, cholesterol 180, , and HDL 44


EKG: Sinus tachycardia


CXR: Cardiomegaly plus pulmonary vascular congestion





3/4


Patient seen and examined in the cardiac stepdown unit.  No significant events. 

Yesterday, patient underwent echo which showed EF 55 to 60% with no obvious wall

motion abnormalities.  Overnight patient had bursts of PVT's lasting less than 

30 seconds.  TSH and magnesium ordered yesterday were within normal limits, A1c 

was 7.9.  Blood pressures 122/49, heart rate 75, pulse ox 91% on 3 L.  Repeat 

blood work reveals WBC 13.9, hemoglobin 13.4, sodium 139, potassium 4.1, 

chloride 93, bicarb 39, glucose 137, and TSH 1.19.  Patient continues to have 

good urine output, documented that he is put out at least 1 L but after speaking

with nursing staff they report the patient "does not aim well "and they believe 

his urine output is greater than what is actually recorded.





3/5


Patient seen and examined in the cardiac stepdown unit.  No significant 

overnight events.  Patient had a urine output of 2 L in the last 24 hours, medardo hassan continue to mention that he has trouble aiming and believe the urine 

output is greater than is what is actually being recorded.  Heart rate 62, 

respiratory rate 18, blood pressure 139/77, O2 saturation 98% on 3 L.  Repeat 

blood work reveals WBC 13.2, hemoglobin 13.3, sodium 138, bicarb 41.  Patient 

continues to have lower extremity swelling with no marked improvement from 

previous days.





3/6/2025


Patient still continues to be very lethargic and short of breath.  We did try to

give him CPAP last night however he did not use it much.  Noticed to be 

hypertensive today, kidney function is stable, significantly hypercapnic.





3/7/2025


Patient is very lethargic still short of breath.  BP still uncontrolled with 

some readings at 170/84, repeat 143/63, heart rate 67 bpm.





3/8/2025


Patient is very lethargic and still short of breath.


Denies any chest pain chest pressure, still appears volume overloaded.  Kidney 

function is stayed stable,





3/10


Patient seen and examined.  Patient continues to have lower extremity edema and 

bilateral wheezing.  Blood pressure 131/70, heart rate 60, pulse ox 97% on 2 L 

nasal cannula.  Pulse ox drops to 88% off oxygen.  Repeat blood work reveals WBC

9.2, hemoglobin 14.5.  CO2 is 44, BUN 39 creatinine 1.39.  Potassium 3.2.  

Potassium has been replaced.





PHYSICAL EXAM


Vital signs reviewed.


General: non toxic, no distress, morbidly obese


HEENT: Head is normocephalic. Pupils are equal, round. Sclerae anicteric. Mucous

membranes of the mouth are moist.  No JVD. No carotid bruit.


Cardiovascular: S1S2 reg, no murmur, 1+ pitting edema bilaterally up to the 

knee, worse in left leg


Lungs: Decreased air entry bilaterally, no rhonchi, no rales, no accessory 

muscle use, mild wheezing auscultated bilaterally


Abdominal: soft,  nontender to palpation, no guarding


NEUROLOGIC EXAMINATION: Patient is awake, alert and oriented x3. 





ASSESSMENT


-Acute HFpEF exacerbation


-Type II NSTEMI due to CHF exacerbation


-Acute hypoxic and hypercapnic respiratory failure


Newly diagnosed type 2 diabetes, HbA1c 7.9,


Dyslipidemia, LDL 92,


-Hypercapnia


-Undiagnosed JUAREZ with pickwickian syndrome


-Morbid obesity


-Hypertension





PLAN


Continue current cardiac medications


Recommend JUAREZ management.  


At this time patient is cleared from cardiovascular standpoint. 


Discharge disposition, social work consult


Patient will need ischemic evaluation and pulmonary hypertension with a left and

right heart catheterization on outpatient basis.





Nurse practitioner note has been reviewed, I agree with documented findings and 

plan of care.  Patient was seen and examined.








Objective





- Vital Signs


Vital signs: 


                                   Vital Signs











Temp  98.3 F   03/10/25 08:34


 


Pulse  54 L  03/10/25 08:34


 


Resp  15   03/10/25 08:34


 


BP  110/59   03/10/25 08:34


 


Pulse Ox  96   03/10/25 08:34


 


FiO2  32   03/10/25 04:15








                                 Intake & Output











 03/09/25 03/10/25 03/10/25





 18:59 06:59 18:59


 


Intake Total 920  120


 


Output Total 300  


 


Balance 620  120


 


Weight  166 kg 


 


Intake:   


 


  IV 20  


 


    Invasive Line 1 10  


 


    Invasive Line 2 10  


 


  Oral 900  120


 


Output:   


 


  Urine 300  


 


Other:   


 


  Voiding Method Toilet Toilet Toilet





 Bedside Commode Bedside Commode Bedside Commode


 


  # Voids 1 1 


 


  # Bowel Movements 1  1














- Labs


CBC & Chem 7: 


                                 03/10/25 06:55





                                 03/10/25 06:55


Labs: 


                  Abnormal Lab Results - Last 24 Hours (Table)











  03/10/25 03/10/25 Range/Units





  06:55 06:55 


 


Lymphocytes #  0.9 L   (1.0-4.8)  k/uL


 


Potassium   3.2 L  (3.5-5.1)  mmol/L


 


Chloride   84 L  ()  mmol/L


 


Carbon Dioxide   44 H*  (22-30)  mmol/L


 


BUN   39 H  (9-20)  mg/dL


 


Creatinine   1.39 H  (0.66-1.25)  mg/dL


 


Glucose   108 H  (74-99)  mg/dL

## 2025-03-10 NOTE — P.PN
Subjective


Progress Note Date: 03/10/25





On today's evaluation of 3/10/2025, the patient is being seen for a follow-up.  

Patient is morbidly obese with a body mass index of 51 and he has chronic 

hypoxic/hypercapnic respiratory failure.  He also has features of obese 

hypoventilation syndrome/obstructive sleep apnea.  The patient is currently on 2

L of oxygen by nasal cannula.  The patient has been diuresed adequately and the 

patient is feeling better.  Sitting up in a chair.  Denies having any specific 

complaints.  Remains on a combination of Bumex and Aldactone.  On today's blood 

work, the creatinine is up to 1.39 with a BUN of 39.  Serum bicarb is at 44.  

Sodium levels at 137 and a potassium level is at 3.2.  White cell count of 9.2 

with a hemoglobin of 14.5 and a platelet count of 161.  The patient denies 

having any specific complaints.  On 2 L of oxygen, his pulse ox is 96%.  His 

echocardiogram that was done on 3/3/2025 shows a preserved LV function with an 

ejection fraction of 55%.  No significant valvular abnormalities.  LV wall was 

quite thickened.  His chest x-ray that was done on 3/2/2005 at the time of 

admission showed evidence of cardiomegaly and pulmonary vascular congestion.  

Overall, the patient is feeling improved.





Objective





- Vital Signs


Vital signs: 


                                   Vital Signs











Temp  98.3 F   03/10/25 08:34


 


Pulse  70   03/10/25 12:38


 


Resp  18   03/10/25 11:04


 


BP  131/70   03/10/25 11:04


 


Pulse Ox  88 L  03/10/25 11:32


 


FiO2  32   03/10/25 04:15








                                 Intake & Output











 03/09/25 03/10/25 03/10/25





 18:59 06:59 18:59


 


Intake Total 920  120


 


Output Total 300  800


 


Balance 620  -680


 


Weight  166 kg 


 


Intake:   


 


  IV 20  


 


    Invasive Line 1 10  


 


    Invasive Line 2 10  


 


  Oral 900  120


 


Output:   


 


  Urine 300  800


 


Other:   


 


  Voiding Method Toilet Toilet Toilet





 Bedside Commode Bedside Commode Bedside Commode


 


  # Voids 1 1 


 


  # Bowel Movements 1  1














- Exam








No acute distress, oriented 3.  Currently on 2 liters nasal cannula.  No 

respiratory distress.  





HEENT examination is grossly unremarkable.  Mucous membranes are moist.  No oral

lesions.





Neck supple.  Full range of motion.  No adenopathy thyromegaly or neck vein 

distention.





Cardiovascular examination reveals regular rhythm rate.  S1-S2 normal.  No S3 or

S4.  No discernible murmur noted.  Heart sounds are distant.





Lungs reveal scattered rhonchi.  There are some basilar crackles.  No wheezes.  

Breath sounds are equal bilaterally.





Abdomen obese, with bowel sounds.  No masses or tenderness.





Extremities reveal lower extremity edema.  No cyanosis or clubbing.  Lower 

extremity edema is gradually improving.





Skin is without rash or lesion.





Neurologic examination is brief but nonfocal.





- Labs


CBC & Chem 7: 


                                 03/10/25 06:55





                                 03/10/25 06:55


Labs: 


                  Abnormal Lab Results - Last 24 Hours (Table)











  03/10/25 03/10/25 Range/Units





  06:55 06:55 


 


Lymphocytes #  0.9 L   (1.0-4.8)  k/uL


 


Potassium   3.2 L  (3.5-5.1)  mmol/L


 


Chloride   84 L  ()  mmol/L


 


Carbon Dioxide   44 H*  (22-30)  mmol/L


 


BUN   39 H  (9-20)  mg/dL


 


Creatinine   1.39 H  (0.66-1.25)  mg/dL


 


Glucose   108 H  (74-99)  mg/dL














Assessment and Plan


Plan: 








Acute on chronic hypoxic/hypercapnic respiratory failure.  The patient presented

to us with a component of CHF/diastolic dysfunction and edema, improved and the 

patient is currently on 2 L of O2 nasal cannula.  The blood gases from 3/5/2025 

showed acute on chronic hypercapnic respiratory failure along with chronic 

hypoxemia





Morbid obesity with a BMI of 51.0





Clinically suspected obstructive sleep apnea with a component of obese 

hypoventilation syndrome and chronic hypercapnic respiratory failure





Bronchial asthma





Hypertension





Chronic metabolic alkalosis





Preserved LV function with an ejection fraction of 55 to 60%





Acute kidney injury and the creatinine is up to 1.39 and the patient remains on 

a combination of Bumex and Aldactone





Chronic lower extremity edema





Plan





Patient is currently comfortable on 2 L of oxygen by nasal cannula


Continue oral Bumex and Aldactone


Obtain a follow-up blood work and check renal function based on the observed 

acute kidney injury


Continues to have lower extremity edema


Overall condition is improved.  The BiPAP is at the bedside.  This is set at a 

pressure of 14 over 5 cm of water.  Advised to use the BiPAP specially 

overnight.


Outpatient sleep study


Will benefit from a BiPAP machine on the long-term.


IV fluids are KVO


Rest of the medication was reviewed.  No other changes will continue to follow.


Thank you


Time with Patient: Greater than 30

## 2025-03-24 ENCOUNTER — HOSPITAL ENCOUNTER (OUTPATIENT)
Dept: HOSPITAL 47 - LABWHC1 | Age: 65
Discharge: HOME | End: 2025-03-24
Attending: STUDENT IN AN ORGANIZED HEALTH CARE EDUCATION/TRAINING PROGRAM
Payer: COMMERCIAL

## 2025-03-24 DIAGNOSIS — D72.9: ICD-10-CM

## 2025-03-24 DIAGNOSIS — R79.89: ICD-10-CM

## 2025-03-24 DIAGNOSIS — E03.9: ICD-10-CM

## 2025-03-24 DIAGNOSIS — Z13.6: Primary | ICD-10-CM

## 2025-03-24 DIAGNOSIS — E78.5: ICD-10-CM

## 2025-03-24 DIAGNOSIS — E11.9: ICD-10-CM

## 2025-03-24 DIAGNOSIS — I50.9: ICD-10-CM

## 2025-03-24 LAB
ALBUMIN SERPL-MCNC: 4.1 G/DL (ref 3.8–4.9)
ALBUMIN/GLOB SERPL: 1.41 RATIO (ref 1.6–3.17)
ALP SERPL-CCNC: 86 U/L (ref 41–126)
ALT SERPL-CCNC: 29 U/L (ref 10–49)
ANION GAP SERPL CALC-SCNC: 13.1 MMOL/L (ref 4–12)
AST SERPL-CCNC: 31 U/L (ref 14–35)
BUN SERPL-SCNC: 13.2 MG/DL (ref 9–27)
BUN/CREAT SERPL: 16.5 RATIO (ref 12–20)
CALCIUM SPEC-MCNC: 9.6 MG/DL (ref 8.7–10.3)
CHLORIDE SERPL-SCNC: 99 MMOL/L (ref 96–109)
CO2 SERPL-SCNC: 28.9 MMOL/L (ref 21.6–31.8)
ERYTHROCYTE [DISTWIDTH] IN BLOOD BY AUTOMATED COUNT: 5.18 X 10*6/UL (ref 4.4–5.6)
ERYTHROCYTE [DISTWIDTH] IN BLOOD: 14.6 % (ref 11.5–14.5)
GLOBULIN SER CALC-MCNC: 2.9 G/DL (ref 1.6–3.3)
GLUCOSE SERPL-MCNC: 153 MG/DL (ref 70–110)
HCT VFR BLD AUTO: 48.4 % (ref 39.6–50)
HGB BLD-MCNC: 15.8 G/DL (ref 13–17)
MCH RBC QN AUTO: 30.5 PG (ref 27–32)
MCHC RBC AUTO-ENTMCNC: 32.6 G/DL (ref 32–37)
MCV RBC AUTO: 93.4 FL (ref 80–97)
NRBC BLD AUTO-RTO: 0 X 10*3/UL (ref 0–0.01)
NT-PROBNP SERPL-MCNC: 201 PG/ML (ref 0–125)
PLATELET # BLD AUTO: 288 X 10*3/UL (ref 140–440)
POTASSIUM SERPL-SCNC: 4.8 MMOL/L (ref 3.5–5.5)
PROT SERPL-MCNC: 7 G/DL (ref 6.2–8.2)
SODIUM SERPL-SCNC: 141 MMOL/L (ref 135–145)
WBC # BLD AUTO: 13.65 X 10*3/UL (ref 4.5–10)

## 2025-03-24 PROCEDURE — 83036 HEMOGLOBIN GLYCOSYLATED A1C: CPT

## 2025-03-24 PROCEDURE — 83880 ASSAY OF NATRIURETIC PEPTIDE: CPT

## 2025-03-24 PROCEDURE — 80053 COMPREHEN METABOLIC PANEL: CPT

## 2025-03-24 PROCEDURE — 85027 COMPLETE CBC AUTOMATED: CPT

## 2025-03-24 PROCEDURE — 36415 COLL VENOUS BLD VENIPUNCTURE: CPT
